# Patient Record
Sex: MALE | Race: ASIAN | NOT HISPANIC OR LATINO
[De-identification: names, ages, dates, MRNs, and addresses within clinical notes are randomized per-mention and may not be internally consistent; named-entity substitution may affect disease eponyms.]

---

## 2019-07-14 ENCOUNTER — FORM ENCOUNTER (OUTPATIENT)
Age: 54
End: 2019-07-14

## 2019-07-15 ENCOUNTER — APPOINTMENT (OUTPATIENT)
Dept: MRI IMAGING | Facility: HOSPITAL | Age: 54
End: 2019-07-15
Payer: COMMERCIAL

## 2019-07-15 ENCOUNTER — APPOINTMENT (OUTPATIENT)
Dept: OTOLARYNGOLOGY | Facility: CLINIC | Age: 54
End: 2019-07-15
Payer: COMMERCIAL

## 2019-07-15 ENCOUNTER — OUTPATIENT (OUTPATIENT)
Dept: OUTPATIENT SERVICES | Facility: HOSPITAL | Age: 54
LOS: 1 days | End: 2019-07-15
Payer: COMMERCIAL

## 2019-07-15 VITALS
DIASTOLIC BLOOD PRESSURE: 79 MMHG | OXYGEN SATURATION: 98 % | WEIGHT: 109 LBS | BODY MASS INDEX: 17.52 KG/M2 | SYSTOLIC BLOOD PRESSURE: 114 MMHG | HEIGHT: 66 IN | HEART RATE: 78 BPM

## 2019-07-15 DIAGNOSIS — Z98.49 CATARACT EXTRACTION STATUS, UNSPECIFIED EYE: Chronic | ICD-10-CM

## 2019-07-15 PROCEDURE — 99203 OFFICE O/P NEW LOW 30 MIN: CPT | Mod: 25

## 2019-07-15 PROCEDURE — A9585: CPT

## 2019-07-15 PROCEDURE — 31231 NASAL ENDOSCOPY DX: CPT

## 2019-07-15 PROCEDURE — 70543 MRI ORBT/FAC/NCK W/O &W/DYE: CPT

## 2019-07-15 PROCEDURE — 70543 MRI ORBT/FAC/NCK W/O &W/DYE: CPT | Mod: 26

## 2019-07-15 NOTE — HISTORY OF PRESENT ILLNESS
[de-identified] : 54M with hx of NP cancer\par completed RT in 1/2019 and chemotherapy in 05/2019\par still with congestion, otalgia, dysphagia and odynophagia \par \par post treatment PET CT still with disease

## 2019-07-15 NOTE — PROCEDURE
[FreeTextEntry1] : nasopharyngoscopy and laryngoscopy  [FreeTextEntry3] : edema in nasopharynx with tumor\par tumor extends from NP to OP\par larynx wnl

## 2019-07-15 NOTE — ASSESSMENT
[FreeTextEntry1] : 54M with hx of NP cancer treated with RT (1/2019) and chemo (05/2019)\par clinical and radiographic signs (PET CT post treatment) of recurrence \par -obtain MRI face/neck with and without contrast asap \par

## 2019-07-15 NOTE — REVIEW OF SYSTEMS
[Nasal Congestion] : nasal congestion [Recurrent Sinus Infections] : recurrent sinus infections [Sinus Pain] : sinus pain [Sense Of Smell Problem] : sense of smell problem [Discolored Nasal Discharge] : discolored nasal discharge [Swelling Neck] : swelling neck

## 2019-07-17 ENCOUNTER — APPOINTMENT (OUTPATIENT)
Dept: OTOLARYNGOLOGY | Facility: CLINIC | Age: 54
End: 2019-07-17
Payer: COMMERCIAL

## 2019-07-17 VITALS
OXYGEN SATURATION: 96 % | SYSTOLIC BLOOD PRESSURE: 138 MMHG | DIASTOLIC BLOOD PRESSURE: 96 MMHG | HEART RATE: 97 BPM | TEMPERATURE: 98.3 F

## 2019-07-17 PROCEDURE — 99213 OFFICE O/P EST LOW 20 MIN: CPT | Mod: 25

## 2019-07-17 PROCEDURE — 31231 NASAL ENDOSCOPY DX: CPT

## 2019-07-17 NOTE — HISTORY OF PRESENT ILLNESS
[de-identified] : 54M w/ PMH of nasopharyngeal cancer, sent for RT (completed 01/2019) and chemo (completed 05/2019) who was referred back after PET scan showed growth/recurrence of tumor. Patient reports headache, bloody nasal mucous, nasal obstruction, left facial swelling, dysphagia of solid food, left ear and nasal pain. He denies antonio epistaxis.  [FreeTextEntry1] : MRI performed 7/15/19- showing tumor extension to entire skull base and enveloping bilateral carotid arteries.

## 2019-07-17 NOTE — PHYSICAL EXAM
[FreeTextEntry1] : cachexic [de-identified] : enlarged submandibular LNs [Nasal Endoscopy Performed] : nasal endoscopy was performed, see procedure section for findings [] : septum deviated to the right [de-identified] : swollen and erythematous nasopharynx B/L [de-identified] : leukoplakia of posterior oropharynx and swelling of soft palate L>R [de-identified] : cavities in multiple teeth [de-identified] : left facial swelling

## 2019-07-17 NOTE — ASSESSMENT
[FreeTextEntry1] : 54M with a PMH of nasopharyngeal CA, s/p unsuccessful RT and chemo, with evidence of tumor expansion on MRI and PET; poor prognosis.\par \par Plan:\par - present at tumor board\par - Rx Bactrim DS for thick nasal secretions\par - f/u with rad/onc for further care

## 2019-07-17 NOTE — REVIEW OF SYSTEMS
[Ear Pain] : ear pain [Nasal Congestion] : nasal congestion [Nose Bleeds] : no nose bleeds [Sinus Pressure] : sinus pressure [Discolored Nasal Discharge] : discolored nasal discharge [As Noted in HPI] : as noted in HPI [Negative] : Allergies [de-identified] : L ear pain [de-identified] : d

## 2019-07-27 ENCOUNTER — OTHER (OUTPATIENT)
Age: 54
End: 2019-07-27

## 2019-07-29 ENCOUNTER — OUTPATIENT (OUTPATIENT)
Dept: OUTPATIENT SERVICES | Facility: HOSPITAL | Age: 54
LOS: 1 days | End: 2019-07-29
Payer: COMMERCIAL

## 2019-07-29 ENCOUNTER — OUTPATIENT (OUTPATIENT)
Dept: OUTPATIENT SERVICES | Facility: HOSPITAL | Age: 54
LOS: 1 days | Discharge: HOME | End: 2019-07-29
Payer: COMMERCIAL

## 2019-07-29 ENCOUNTER — OTHER (OUTPATIENT)
Age: 54
End: 2019-07-29

## 2019-07-29 ENCOUNTER — RESULT REVIEW (OUTPATIENT)
Age: 54
End: 2019-07-29

## 2019-07-29 DIAGNOSIS — Z98.49 CATARACT EXTRACTION STATUS, UNSPECIFIED EYE: Chronic | ICD-10-CM

## 2019-07-29 DIAGNOSIS — C11.9 MALIGNANT NEOPLASM OF NASOPHARYNX, UNSPECIFIED: ICD-10-CM

## 2019-07-29 LAB — SURGICAL PATHOLOGY STUDY: SIGNIFICANT CHANGE UP

## 2019-07-29 PROCEDURE — 88321 CONSLTJ&REPRT SLD PREP ELSWR: CPT

## 2019-07-30 ENCOUNTER — APPOINTMENT (OUTPATIENT)
Dept: HEMATOLOGY ONCOLOGY | Facility: CLINIC | Age: 54
End: 2019-07-30
Payer: COMMERCIAL

## 2019-07-30 ENCOUNTER — RESULT REVIEW (OUTPATIENT)
Age: 54
End: 2019-07-30

## 2019-07-30 ENCOUNTER — LABORATORY RESULT (OUTPATIENT)
Age: 54
End: 2019-07-30

## 2019-07-30 VITALS
OXYGEN SATURATION: 100 % | TEMPERATURE: 97.8 F | SYSTOLIC BLOOD PRESSURE: 103 MMHG | HEIGHT: 66 IN | RESPIRATION RATE: 16 BRPM | WEIGHT: 106.9 LBS | BODY MASS INDEX: 17.18 KG/M2 | HEART RATE: 86 BPM | DIASTOLIC BLOOD PRESSURE: 79 MMHG

## 2019-07-30 DIAGNOSIS — Z56.0 UNEMPLOYMENT, UNSPECIFIED: ICD-10-CM

## 2019-07-30 DIAGNOSIS — Z78.9 OTHER SPECIFIED HEALTH STATUS: ICD-10-CM

## 2019-07-30 DIAGNOSIS — Z82.0 FAMILY HISTORY OF EPILEPSY AND OTHER DISEASES OF THE NERVOUS SYSTEM: ICD-10-CM

## 2019-07-30 DIAGNOSIS — Z87.891 PERSONAL HISTORY OF NICOTINE DEPENDENCE: ICD-10-CM

## 2019-07-30 DIAGNOSIS — Z86.39 PERSONAL HISTORY OF OTHER ENDOCRINE, NUTRITIONAL AND METABOLIC DISEASE: ICD-10-CM

## 2019-07-30 LAB
BASOPHILS # BLD AUTO: 0.1 K/UL — SIGNIFICANT CHANGE UP (ref 0–0.2)
BASOPHILS NFR BLD AUTO: 0.7 % — SIGNIFICANT CHANGE UP (ref 0–2)
EOSINOPHIL # BLD AUTO: 0.8 K/UL — HIGH (ref 0–0.5)
EOSINOPHIL NFR BLD AUTO: 5.4 % — SIGNIFICANT CHANGE UP (ref 0–6)
HCT VFR BLD CALC: 31.2 % — LOW (ref 39–50)
HGB BLD-MCNC: 9.6 G/DL — LOW (ref 13–17)
LYMPHOCYTES # BLD AUTO: 1 K/UL — SIGNIFICANT CHANGE UP (ref 1–3.3)
LYMPHOCYTES # BLD AUTO: 7.2 % — LOW (ref 13–44)
MCHC RBC-ENTMCNC: 21.8 PG — LOW (ref 27–34)
MCHC RBC-ENTMCNC: 30.8 G/DL — LOW (ref 32–36)
MCV RBC AUTO: 70.5 FL — LOW (ref 80–100)
MONOCYTES # BLD AUTO: 1 K/UL — HIGH (ref 0–0.9)
MONOCYTES NFR BLD AUTO: 7.1 % — SIGNIFICANT CHANGE UP (ref 2–14)
NEUTROPHILS # BLD AUTO: 11.2 K/UL — HIGH (ref 1.8–7.4)
NEUTROPHILS NFR BLD AUTO: 79.6 % — HIGH (ref 43–77)
PLATELET # BLD AUTO: 441 K/UL — HIGH (ref 150–400)
RBC # BLD: 4.43 M/UL — SIGNIFICANT CHANGE UP (ref 4.2–5.8)
RBC # FLD: 15.2 % — HIGH (ref 10.3–14.5)
WBC # BLD: 14.1 K/UL — HIGH (ref 3.8–10.5)
WBC # FLD AUTO: 14.1 K/UL — HIGH (ref 3.8–10.5)

## 2019-07-30 PROCEDURE — 99205 OFFICE O/P NEW HI 60 MIN: CPT

## 2019-07-30 RX ORDER — SULFAMETHOXAZOLE AND TRIMETHOPRIM 800; 160 MG/1; MG/1
800-160 TABLET ORAL TWICE DAILY
Qty: 28 | Refills: 0 | Status: DISCONTINUED | COMMUNITY
Start: 2019-07-17 | End: 2019-07-30

## 2019-07-30 SDOH — ECONOMIC STABILITY - INCOME SECURITY: UNEMPLOYMENT, UNSPECIFIED: Z56.0

## 2019-07-30 NOTE — ASSESSMENT
[FreeTextEntry1] : Mr. BIGG GARZA is a pleasant 54 year old man who comes here today to be evaluated for his diagnosis of refractory locally advanced nasopharyngeal cancer. Unfortunately, tumor is refractory/resistant to chemo and radiation. \par \par Pain- started on gabapentin and oxycodone (with Compazine prior). Refer to pain service. Laxatives prescribed. \par \par Oral thrush- fluconazole.\par \par Weight loss- nutritionist.\par \par We discussed with the patient about immunotherapy, explaining the mechanism of actions of immunotherapy agents, potential side effects which include but are not limited to auto-immune processes, such as pneumonitis, skin rashes, colitis, hepatitis, myositis, endocrine effects.\par \par Today I met with our patient regarding treatment with systemic therapy with pembrolizumab.  During the visit, we explained in detail the rationale for both this treatment as well as other standard treatment and palliative options if any available.  We presented and discussed in detail the potential side effects of the medication(s) and procedures. We had given our patient the informed consent form for their review, and all questions about the treatment and potential side effects were discussed and questions answered by me to the patient's apparent satisfaction.\par \par After weighing the risks and benefits, our patient expressed the agreement to receive therapy as indicated above.  The patient signed and dated a copy of the informed consent with a witness as indicated. We gave our patient a copy of the signed informed consent. We placed a copy of the consent form in the [facility] for our record keeping. \par \par Return to clinic on 8/2/19 to evaluate pain control. Pembrolizumab scheduled for 8/2 at 4PM.\par \par \par Marcia Leon MD\par , [facility]\par [facility]\par [address]\[place], [place], [address]\par Tel: [phone]\par Fax: [phone]\par \par

## 2019-07-30 NOTE — PHYSICAL EXAM
[Ambulatory and capable of all self care but unable to carry out any work activities] : Status 2- Ambulatory and capable of all self care but unable to carry out any work activities. Up and about more than 50% of waking hours [Cachectic] : cachectic [Normal] : grossly intact [de-identified] : oral thrush, facial edema on left side face

## 2019-07-30 NOTE — REASON FOR VISIT
[Initial Consultation] : an initial consultation [Spouse] : spouse [FreeTextEntry2] : nasopharyngeal cancer

## 2019-07-30 NOTE — HISTORY OF PRESENT ILLNESS
[Disease: _____________________] : Disease: [unfilled] [de-identified] : non-keratinizing nasopharyngeal carcinoma [de-identified] : faint lung infiltrates\par refractory to chemo/radiation [de-identified] : Mr. BIGG GARZA is a pleasant 54 year old man who comes here today to be evaluated for his diagnosis of nasopharyngeal cancer.\par \par Oncological history\par Mr. Garza noted around March 2018 along with left sided hearing loss, bilateral nasal congestion and headaches. As symptoms did not improved, he sought medical care. Eventually he underwent a biopsy in October 2018 as below. \par \par Pathology- nasopharyngeal biopsy ([place]) 10/19/18- non-keratinizing, differentiated nasopharyngeal carcinoma\par \par CT chest 11/13/18- small calcified granuloma right apex. Subcentimeter right hepatic cyst 4mm. \par \par MRI neck 11/13/2018- 2.6x4.2x5.4cm left nasopharyngeal carcinoma extending into the skull base. Borderline right level 1 and 2 nodes. \par \par Treated with radiation (70Gy) with cisplatin followed by adjuvant cisplatin 70mg/m2 and 5FU from November 27, 2018 to May 8, 2019 at [facility]. \par \par CT neck 4/16/19- decrease in size of nasopharyngeal mass. \par \par Around March 2019 he continued to have headaches and as they became worse, further imaging was done. \par \par PET 7/9/19- Large lobulated active nasopharyngeal mass with SUV of 21.9, extending from the upper oropharynx into the skull base. Comparison to CT form April shows tumor grow. Multiple faint infiltrates in both lungs, likely inflammatory. There is no evidence of metastatic disease- official report not available. \par \par MRI head 7/15/19\par Findings are compatible with extensive recurrence involving the central skull base, nasopharynx, oropharynx, and parapharyngeal soft tissues, as above. Polypoid intraluminal mass filling the nasopharynx and oropharynx demonstrates unusual signal characteristics along its inferior margin with intrinsic high T1 signal and internal signal voids at are possibly vascular. \par \par \par \par  [de-identified] : EBV and p16 positive [de-identified] : Patient is complaining of "tearing apart" headaches on top of head, radiating to ear and jaw and all over his left side, 10/10. Taking Aleve for pain, but not having any improvement. Percocet makes him nauseated. Nasal discharge bilaterally constant, bloody. Lost weight. Daily cough. Has feeling that there is something stuck in throat. Grade 1 neuropathy hands and feet. Constipation. Limited activity due to headache. Not sleeping due to pain. Anorexia. Edema on left side of face and chin. All other ROS negative.

## 2019-07-31 ENCOUNTER — OTHER (OUTPATIENT)
Age: 54
End: 2019-07-31

## 2019-07-31 LAB
ALBUMIN SERPL ELPH-MCNC: 3.8 G/DL
ALP BLD-CCNC: 68 U/L
ALT SERPL-CCNC: 9 U/L
ANION GAP SERPL CALC-SCNC: 10 MMOL/L
AST SERPL-CCNC: 13 U/L
BILIRUB SERPL-MCNC: 0.2 MG/DL
BUN SERPL-MCNC: 21 MG/DL
CALCIUM SERPL-MCNC: 9.8 MG/DL
CHLORIDE SERPL-SCNC: 96 MMOL/L
CO2 SERPL-SCNC: 30 MMOL/L
CREAT SERPL-MCNC: 1.38 MG/DL
GLUCOSE SERPL-MCNC: 136 MG/DL
HBV CORE IGG+IGM SER QL: NONREACTIVE
HBV CORE IGM SER QL: NONREACTIVE
HBV SURFACE AB SER QL: NONREACTIVE
HBV SURFACE AG SER QL: NONREACTIVE
HCV AB SER QL: NONREACTIVE
HCV S/CO RATIO: 0.15 S/CO
MAGNESIUM SERPL-MCNC: 1.5 MG/DL
PHOSPHATE SERPL-MCNC: 3.7 MG/DL
POTASSIUM SERPL-SCNC: 4.7 MMOL/L
PROT SERPL-MCNC: 6.5 G/DL
SODIUM SERPL-SCNC: 136 MMOL/L
TSH SERPL-ACNC: 8.44 UIU/ML

## 2019-07-31 RX ORDER — LEVOTHYROXINE SODIUM 0.03 MG/1
25 TABLET ORAL
Qty: 30 | Refills: 6 | Status: ACTIVE | COMMUNITY
Start: 2019-07-31 | End: 1900-01-01

## 2019-08-02 ENCOUNTER — APPOINTMENT (OUTPATIENT)
Dept: HEMATOLOGY ONCOLOGY | Facility: CLINIC | Age: 54
End: 2019-08-02
Payer: COMMERCIAL

## 2019-08-02 ENCOUNTER — APPOINTMENT (OUTPATIENT)
Dept: INFUSION THERAPY | Facility: HOSPITAL | Age: 54
End: 2019-08-02

## 2019-08-02 VITALS
HEART RATE: 107 BPM | SYSTOLIC BLOOD PRESSURE: 95 MMHG | RESPIRATION RATE: 17 BRPM | DIASTOLIC BLOOD PRESSURE: 67 MMHG | BODY MASS INDEX: 17.12 KG/M2 | OXYGEN SATURATION: 95 % | WEIGHT: 106.04 LBS | TEMPERATURE: 98.8 F

## 2019-08-02 PROCEDURE — 93010 ELECTROCARDIOGRAM REPORT: CPT

## 2019-08-02 PROCEDURE — 99213 OFFICE O/P EST LOW 20 MIN: CPT

## 2019-08-05 ENCOUNTER — CLINICAL ADVICE (OUTPATIENT)
Age: 54
End: 2019-08-05

## 2019-08-05 DIAGNOSIS — Z51.11 ENCOUNTER FOR ANTINEOPLASTIC CHEMOTHERAPY: ICD-10-CM

## 2019-08-05 DIAGNOSIS — E86.0 DEHYDRATION: ICD-10-CM

## 2019-08-05 DIAGNOSIS — R11.2 NAUSEA WITH VOMITING, UNSPECIFIED: ICD-10-CM

## 2019-08-05 NOTE — PHYSICAL EXAM
[Ambulatory and capable of all self care but unable to carry out any work activities] : Status 2- Ambulatory and capable of all self care but unable to carry out any work activities. Up and about more than 50% of waking hours [Cachectic] : cachectic [Normal] : affect appropriate [de-identified] : oral thrush, facial edema on left side face

## 2019-08-05 NOTE — HISTORY OF PRESENT ILLNESS
[Disease: _____________________] : Disease: [unfilled] [de-identified] : Mr. BIGG GARZA is a pleasant 54 year old man who comes here today to be evaluated for his diagnosis of nasopharyngeal cancer. He is here prior to cycle #1 of pembrolizumab and for follow up re pain control. \par \par Oncological history\par Mr. Garza noted around March 2018 along with left sided hearing loss, bilateral nasal congestion and headaches. As symptoms did not improved, he sought medical care. Eventually he underwent a biopsy in October 2018 as below. \par \par Pathology- nasopharyngeal biopsy ([place]) 10/19/18- non-keratinizing, differentiated nasopharyngeal carcinoma\par \par CT chest 11/13/18- small calcified granuloma right apex. Subcentimeter right hepatic cyst 4mm. \par \par MRI neck 11/13/2018- 2.6x4.2x5.4cm left nasopharyngeal carcinoma extending into the skull base. Borderline right level 1 and 2 nodes. \par \par Treated with radiation (70Gy) with cisplatin followed by adjuvant cisplatin 70mg/m2 and 5FU from November 27, 2018 to May 8, 2019 at [facility]. \par \par CT neck 4/16/19- decrease in size of nasopharyngeal mass. \par \par Around March 2019 he continued to have headaches and as they became worse, further imaging was done. \par \par PET 7/9/19- Large lobulated active nasopharyngeal mass with SUV of 21.9, extending from the upper oropharynx into the skull base. Comparison to CT form April shows tumor grow. Multiple faint infiltrates in both lungs, likely inflammatory. There is no evidence of metastatic disease- official report not available. \par \par MRI head 7/15/19\par Findings are compatible with extensive recurrence involving the central skull base, nasopharynx, oropharynx, and parapharyngeal soft tissues, as above. Polypoid intraluminal mass filling the nasopharynx and oropharynx demonstrates unusual signal characteristics along its inferior margin with intrinsic high T1 signal and internal signal voids at are possibly vascular. \par \par \par \par  [de-identified] : non-keratinizing nasopharyngeal carcinoma [de-identified] : EBV and p16 positive [de-identified] : faint lung infiltrates\par refractory to chemo/radiation [de-identified] : Patient is complaining of "tearing apart" headaches on top of head, radiating to ear and jaw and all over his left side, 10/10. Taking Aleve for pain, but not having any improvement. Percocet makes him nauseated. Nasal discharge bilaterally constant, bloody. Lost weight. Daily cough. Has feeling that there is something stuck in throat. Grade 1 neuropathy hands and feet. Constipation. Limited activity due to headache. Not sleeping due to pain. Anorexia. Edema on left side of face and chin. All other ROS negative.

## 2019-08-05 NOTE — REASON FOR VISIT
[Spouse] : spouse [Initial Consultation] : an initial consultation [FreeTextEntry2] : nasopharyngeal cancer

## 2019-08-05 NOTE — HISTORY OF PRESENT ILLNESS
[Disease: _____________________] : Disease: [unfilled] [de-identified] : Mr. BIGG GARZA is a pleasant 54 year old man who comes here today to be evaluated for his diagnosis of nasopharyngeal cancer. He is here prior to cycle #1 of pembrolizumab and for follow up re pain control. \par \par Oncological history\par Mr. Garza noted around March 2018 along with left sided hearing loss, bilateral nasal congestion and headaches. As symptoms did not improved, he sought medical care. Eventually he underwent a biopsy in October 2018 as below. \par \par Pathology- nasopharyngeal biopsy ([place]) 10/19/18- non-keratinizing, differentiated nasopharyngeal carcinoma\par \par CT chest 11/13/18- small calcified granuloma right apex. Subcentimeter right hepatic cyst 4mm. \par \par MRI neck 11/13/2018- 2.6x4.2x5.4cm left nasopharyngeal carcinoma extending into the skull base. Borderline right level 1 and 2 nodes. \par \par Treated with radiation (70Gy) with cisplatin followed by adjuvant cisplatin 70mg/m2 and 5FU from November 27, 2018 to May 8, 2019 at [facility]. \par \par CT neck 4/16/19- decrease in size of nasopharyngeal mass. \par \par Around March 2019 he continued to have headaches and as they became worse, further imaging was done. \par \par PET 7/9/19- Large lobulated active nasopharyngeal mass with SUV of 21.9, extending from the upper oropharynx into the skull base. Comparison to CT form April shows tumor grow. Multiple faint infiltrates in both lungs, likely inflammatory. There is no evidence of metastatic disease- official report not available. \par \par MRI head 7/15/19\par Findings are compatible with extensive recurrence involving the central skull base, nasopharynx, oropharynx, and parapharyngeal soft tissues, as above. Polypoid intraluminal mass filling the nasopharynx and oropharynx demonstrates unusual signal characteristics along its inferior margin with intrinsic high T1 signal and internal signal voids at are possibly vascular. \par \par \par \par  [de-identified] : non-keratinizing nasopharyngeal carcinoma [de-identified] : EBV and p16 positive [de-identified] : faint lung infiltrates\par refractory to chemo/radiation [de-identified] : Patient is complaining of "tearing apart" headaches on top of head, radiating to ear and jaw and all over his left side, 10/10. Taking Aleve for pain, but not having any improvement. Percocet makes him nauseated. Nasal discharge bilaterally constant, bloody. Lost weight. Daily cough. Has feeling that there is something stuck in throat. Grade 1 neuropathy hands and feet. Constipation. Limited activity due to headache. Not sleeping due to pain. Anorexia. Edema on left side of face and chin. All other ROS negative.

## 2019-08-05 NOTE — ASSESSMENT
[FreeTextEntry1] : Mr. BIGG GARZA is a pleasant 54 year old man who comes here today to be evaluated for his diagnosis of refractory locally advanced nasopharyngeal cancer. Unfortunately, tumor is refractory/resistant to chemo and radiation. \par \par Pain- started on gabapentin and oxycodone (with Compazine prior). Refer to pain service. Laxatives prescribed. \par \par Oral thrush- fluconazole.\par \par Weight loss- nutritionist.\par \par We discussed with the patient about immunotherapy, explaining the mechanism of actions of immunotherapy agents, potential side effects which include but are not limited to auto-immune processes, such as pneumonitis, skin rashes, colitis, hepatitis, myositis, endocrine effects.\par \par Today I met with our patient regarding treatment with systemic therapy with pembrolizumab.  During the visit, we explained in detail the rationale for both this treatment as well as other standard treatment and palliative options if any available.  We presented and discussed in detail the potential side effects of the medication(s) and procedures. We had given our patient the informed consent form for their review, and all questions about the treatment and potential side effects were discussed and questions answered by me to the patient's apparent satisfaction.\par \par After weighing the risks and benefits, our patient expressed the agreement to receive therapy as indicated above.  The patient signed and dated a copy of the informed consent with a witness as indicated. We gave our patient a copy of the signed informed consent. We placed a copy of the consent form in the [facility] for our record keeping. \par \par Return to clinic on 8/2/19 to evaluate pain control. Pembrolizumab scheduled for 8/2 at 4PM.\par \par \par Marcia Leon MD\par , [facility]\par [facility]\par [address]\[place], [place], [address]\par Tel: [phone]\par Fax: [phone]\par \par

## 2019-08-05 NOTE — PHYSICAL EXAM
[Ambulatory and capable of all self care but unable to carry out any work activities] : Status 2- Ambulatory and capable of all self care but unable to carry out any work activities. Up and about more than 50% of waking hours [Cachectic] : cachectic [Normal] : affect appropriate [de-identified] : oral thrush, facial edema on left side face

## 2019-08-05 NOTE — ASSESSMENT
[FreeTextEntry1] : Mr. BIGG GARZA is a pleasant 54 year old man who comes here today to be evaluated for his diagnosis of refractory locally advanced nasopharyngeal cancer. Unfortunately, tumor is refractory/resistant to chemo and radiation. \par \par Pain- started on gabapentin and oxycodone (with Compazine prior). Refer to pain service. Laxatives prescribed. \par \par Oral thrush- fluconazole.\par \par Weight loss- nutritionist.\par \par We discussed with the patient about immunotherapy, explaining the mechanism of actions of immunotherapy agents, potential side effects which include but are not limited to auto-immune processes, such as pneumonitis, skin rashes, colitis, hepatitis, myositis, endocrine effects.\par \par Today I met with our patient regarding treatment with systemic therapy with pembrolizumab.  During the visit, we explained in detail the rationale for both this treatment as well as other standard treatment and palliative options if any available.  We presented and discussed in detail the potential side effects of the medication(s) and procedures. We had given our patient the informed consent form for their review, and all questions about the treatment and potential side effects were discussed and questions answered by me to the patient's apparent satisfaction.\par \par After weighing the risks and benefits, our patient expressed the agreement to receive therapy as indicated above.  The patient signed and dated a copy of the informed consent with a witness as indicated. We gave our patient a copy of the signed informed consent. We placed a copy of the consent form in the [facility] for our record keeping. \par \par Return to clinic on 8/2/19 to evaluate pain control. Pembrolizumab scheduled for 8/2 at 4PM.\par \par \par Marcia Leon MD\par , [facility]\par [facility]\par [address]\[place], [place], [address]\par Tel: [phone]\par Fax: [phone]\par \par

## 2019-08-07 ENCOUNTER — OTHER (OUTPATIENT)
Age: 54
End: 2019-08-07

## 2019-08-13 ENCOUNTER — CLINICAL ADVICE (OUTPATIENT)
Age: 54
End: 2019-08-13

## 2019-08-15 ENCOUNTER — CLINICAL ADVICE (OUTPATIENT)
Age: 54
End: 2019-08-15

## 2019-08-16 ENCOUNTER — OTHER (OUTPATIENT)
Age: 54
End: 2019-08-16

## 2019-08-19 ENCOUNTER — OTHER (OUTPATIENT)
Age: 54
End: 2019-08-19

## 2019-08-19 ENCOUNTER — APPOINTMENT (OUTPATIENT)
Dept: OTOLARYNGOLOGY | Facility: CLINIC | Age: 54
End: 2019-08-19
Payer: COMMERCIAL

## 2019-08-19 VITALS
HEIGHT: 66 IN | HEART RATE: 98 BPM | SYSTOLIC BLOOD PRESSURE: 124 MMHG | WEIGHT: 106 LBS | BODY MASS INDEX: 17.04 KG/M2 | DIASTOLIC BLOOD PRESSURE: 92 MMHG

## 2019-08-19 DIAGNOSIS — J32.9 CHRONIC SINUSITIS, UNSPECIFIED: ICD-10-CM

## 2019-08-19 DIAGNOSIS — H91.93 UNSPECIFIED HEARING LOSS, BILATERAL: ICD-10-CM

## 2019-08-19 DIAGNOSIS — H65.93 UNSPECIFIED NONSUPPURATIVE OTITIS MEDIA, BILATERAL: ICD-10-CM

## 2019-08-19 PROCEDURE — 99214 OFFICE O/P EST MOD 30 MIN: CPT | Mod: 25

## 2019-08-19 PROCEDURE — 31231 NASAL ENDOSCOPY DX: CPT

## 2019-08-19 NOTE — HISTORY OF PRESENT ILLNESS
[de-identified] : Pt referred by Dr. Leon.  She has progressive NPC s/p CXRT in [place].  Based on his imaging this is not resectable disease and the patient was evaluated by Dr. Alvarado last month.  He is being treated by Dr. Leon with Keytruda.  Pt is getting second cycle on Friday.  Pt is here for congestion, ear fullness and dysphagia.  Pt has an appointment with Ellen Alonzo, SLP on Thursday for swallow evaluation.

## 2019-08-19 NOTE — PHYSICAL EXAM
[de-identified] : Posttreatment changes. [de-identified] : BRIAN RUSSELL. [FreeTextEntry1] : Clear nasal secretions dripping through both nostrils.   [Normal] : orientation to person, place, and time: normal

## 2019-08-19 NOTE — DATA REVIEWED
[de-identified] : MRI with extensive disease in the NPx and OPx with involvement of the skull base around both internal carotid arteries.

## 2019-08-19 NOTE — PROCEDURE
[None] : none [Flexible Endoscope] : examined with the flexible endoscope [Serial Number: ___] : Serial Number: [unfilled] [FreeTextEntry6] : Very congested nasal cavity with clear thick secretions in both NC from [facility].  The posterior NC appears obstructed from the NPC and I can't really evaluate into the NPx or around the ETO bilaterally.  On retroflexion from the OC, the entire NPx is obstructed. [de-identified] : NPC

## 2019-08-22 ENCOUNTER — APPOINTMENT (OUTPATIENT)
Dept: SPEECH THERAPY | Facility: CLINIC | Age: 54
End: 2019-08-22

## 2019-08-22 ENCOUNTER — APPOINTMENT (OUTPATIENT)
Dept: HEMATOLOGY ONCOLOGY | Facility: CLINIC | Age: 54
End: 2019-08-22
Payer: COMMERCIAL

## 2019-08-22 ENCOUNTER — RESULT REVIEW (OUTPATIENT)
Age: 54
End: 2019-08-22

## 2019-08-22 VITALS
SYSTOLIC BLOOD PRESSURE: 112 MMHG | BODY MASS INDEX: 5.44 KG/M2 | RESPIRATION RATE: 16 BRPM | WEIGHT: 99.21 LBS | DIASTOLIC BLOOD PRESSURE: 82 MMHG | OXYGEN SATURATION: 100 % | HEART RATE: 109 BPM

## 2019-08-22 DIAGNOSIS — R63.4 ABNORMAL WEIGHT LOSS: ICD-10-CM

## 2019-08-22 DIAGNOSIS — R09.81 NASAL CONGESTION: ICD-10-CM

## 2019-08-22 LAB
ALBUMIN SERPL ELPH-MCNC: 3.5 G/DL
ALP BLD-CCNC: 62 U/L
ALT SERPL-CCNC: 18 U/L
ANION GAP SERPL CALC-SCNC: 15 MMOL/L
AST SERPL-CCNC: 24 U/L
BASOPHILS # BLD AUTO: 0.1 K/UL — SIGNIFICANT CHANGE UP (ref 0–0.2)
BASOPHILS NFR BLD AUTO: 0.3 % — SIGNIFICANT CHANGE UP (ref 0–2)
BILIRUB SERPL-MCNC: 0.2 MG/DL
BUN SERPL-MCNC: 15 MG/DL
CALCIUM SERPL-MCNC: 10.3 MG/DL
CHLORIDE SERPL-SCNC: 86 MMOL/L
CO2 SERPL-SCNC: 31 MMOL/L
CREAT SERPL-MCNC: 1.19 MG/DL
EOSINOPHIL # BLD AUTO: 0.1 K/UL — SIGNIFICANT CHANGE UP (ref 0–0.5)
EOSINOPHIL NFR BLD AUTO: 0.7 % — SIGNIFICANT CHANGE UP (ref 0–6)
GLUCOSE SERPL-MCNC: 140 MG/DL
HCT VFR BLD CALC: 28.5 % — LOW (ref 39–50)
HGB BLD-MCNC: 9 G/DL — LOW (ref 13–17)
LYMPHOCYTES # BLD AUTO: 0.8 K/UL — LOW (ref 1–3.3)
LYMPHOCYTES # BLD AUTO: 4.6 % — LOW (ref 13–44)
MAGNESIUM SERPL-MCNC: 1.7 MG/DL
MCHC RBC-ENTMCNC: 21.7 PG — LOW (ref 27–34)
MCHC RBC-ENTMCNC: 31.8 G/DL — LOW (ref 32–36)
MCV RBC AUTO: 68.2 FL — LOW (ref 80–100)
MONOCYTES # BLD AUTO: 1.1 K/UL — HIGH (ref 0–0.9)
MONOCYTES NFR BLD AUTO: 6.7 % — SIGNIFICANT CHANGE UP (ref 2–14)
NEUTROPHILS # BLD AUTO: 14.8 K/UL — HIGH (ref 1.8–7.4)
NEUTROPHILS NFR BLD AUTO: 87.7 % — HIGH (ref 43–77)
PLATELET # BLD AUTO: 538 K/UL — HIGH (ref 150–400)
POTASSIUM SERPL-SCNC: 4.8 MMOL/L
PROT SERPL-MCNC: 6.6 G/DL
RBC # BLD: 4.17 M/UL — LOW (ref 4.2–5.8)
RBC # FLD: 14.9 % — HIGH (ref 10.3–14.5)
SODIUM SERPL-SCNC: 132 MMOL/L
TSH SERPL-ACNC: 3.47 UIU/ML
WBC # BLD: 16.8 K/UL — HIGH (ref 3.8–10.5)
WBC # FLD AUTO: 16.8 K/UL — HIGH (ref 3.8–10.5)

## 2019-08-22 PROCEDURE — 99214 OFFICE O/P EST MOD 30 MIN: CPT

## 2019-08-22 RX ORDER — GABAPENTIN 250 MG/5ML
300 SOLUTION ORAL
Qty: 5 | Refills: 3 | Status: ACTIVE | COMMUNITY
Start: 2019-07-30 | End: 1900-01-01

## 2019-08-22 NOTE — ASSESSMENT
[FreeTextEntry1] : Mr. BIGG GARZA is a pleasant 54 year old man who comes here today to be evaluated for his diagnosis of refractory locally advanced nasopharyngeal cancer. Unfortunately, tumor is refractory/resistant to chemo and radiation. \par \par Proceed with cycle #2 of pembrolizumab tomorrow.\par \par Pain- increased fentanyl patch to 25mcg/day- prescribed. Instructed to take oxycodone 20mg every 4h and continue on f gabapentin and oxycodone (with Compazine prior). \par \par Weight loss- seen by nutritionist today.\par \par Nasal congestion - due to tumor- instructed to sleep on recliner. \par \par Return to clinic in 3 weeks. There is no change in past medical history, social history or family history since our last visit. Cancer diagnosis does not affect other existing medical issues at the time.\par \par \par \par Marcia Leon MD\par , [facility]\par [facility]\par [address]\[place], [place], [address]\par Tel: [phone]\par Fax: [phone]\par \par

## 2019-08-22 NOTE — HISTORY OF PRESENT ILLNESS
[Disease: _____________________] : Disease: [unfilled] [de-identified] : non-keratinizing nasopharyngeal carcinoma [de-identified] : Mr. BIGG GARZA is a pleasant 54 year old man who comes here today prior to cycle #2 of pembrolizumab and to to be evaluated for his diagnosis of nasopharyngeal cancer.\par \par Oncological history\par Mr. Garza noted around March 2018 along with left sided hearing loss, bilateral nasal congestion and headaches. As symptoms did not improved, he sought medical care. Eventually he underwent a biopsy in October 2018 as below. \par \par Pathology- nasopharyngeal biopsy ([place]) 10/19/18- non-keratinizing, differentiated nasopharyngeal carcinoma\par \par CT chest 11/13/18- small calcified granuloma right apex. Subcentimeter right hepatic cyst 4mm. \par \par MRI neck 11/13/2018- 2.6x4.2x5.4cm left nasopharyngeal carcinoma extending into the skull base. Borderline right level 1 and 2 nodes. \par \par Treated with radiation (70Gy) with cisplatin followed by adjuvant cisplatin 70mg/m2 and 5FU from November 27, 2018 to May 8, 2019 at [facility]. \par \par CT neck 4/16/19- decrease in size of nasopharyngeal mass. \par \par Around March 2019 he continued to have headaches and as they became worse, further imaging was done. \par \par PET 7/9/19- Large lobulated active nasopharyngeal mass with SUV of 21.9, extending from the upper oropharynx into the skull base. Comparison to CT form April shows tumor grow. Multiple faint infiltrates in both lungs, likely inflammatory. There is no evidence of metastatic disease- official report not available. \par \par MRI head 7/15/19\par Findings are compatible with extensive recurrence involving the central skull base, nasopharynx, oropharynx, and parapharyngeal soft tissues, as above. Polypoid intraluminal mass filling the nasopharynx and oropharynx demonstrates unusual signal characteristics along its inferior margin with intrinsic high T1 signal and internal signal voids at are possibly vascular. \par \par \par \par  [de-identified] : EBV and p16 positive [de-identified] : faint lung infiltrates\par refractory to chemo/radiation [de-identified] : Patient states that pain is still present - mostly on his head. Pain is still intense at times, does not radiate. Not taking oxycodone dosage prescribed. No nausea or vomiting. Nasal discharge bilaterally constant, bloody. Lost weight. Very tired as per wife.  Grade 1 neuropathy hands and feet. All other ROS negative.

## 2019-08-22 NOTE — PHYSICAL EXAM
[Ambulatory and capable of all self care but unable to carry out any work activities] : Status 2- Ambulatory and capable of all self care but unable to carry out any work activities. Up and about more than 50% of waking hours [Cachectic] : cachectic [Normal] : affect appropriate [de-identified] :  facial edema on left side

## 2019-08-23 ENCOUNTER — APPOINTMENT (OUTPATIENT)
Dept: INFUSION THERAPY | Facility: HOSPITAL | Age: 54
End: 2019-08-23

## 2019-08-26 ENCOUNTER — INPATIENT (INPATIENT)
Facility: HOSPITAL | Age: 54
LOS: 11 days | Discharge: HOME | End: 2019-09-07
Attending: STUDENT IN AN ORGANIZED HEALTH CARE EDUCATION/TRAINING PROGRAM | Admitting: STUDENT IN AN ORGANIZED HEALTH CARE EDUCATION/TRAINING PROGRAM
Payer: COMMERCIAL

## 2019-08-26 ENCOUNTER — APPOINTMENT (OUTPATIENT)
Dept: HEMATOLOGY ONCOLOGY | Facility: CLINIC | Age: 54
End: 2019-08-26

## 2019-08-26 VITALS
SYSTOLIC BLOOD PRESSURE: 147 MMHG | RESPIRATION RATE: 18 BRPM | DIASTOLIC BLOOD PRESSURE: 93 MMHG | OXYGEN SATURATION: 96 % | HEART RATE: 95 BPM

## 2019-08-26 DIAGNOSIS — Z98.49 CATARACT EXTRACTION STATUS, UNSPECIFIED EYE: Chronic | ICD-10-CM

## 2019-08-26 NOTE — ED ADULT TRIAGE NOTE - CHIEF COMPLAINT QUOTE
Pt comes in from home for reported FTT and reported AMS yesterday per wife. Pt appeared to be hallucinating then, today a/o x4. Pt denies CP SOB, is a CA pt at [facility] and brought in for evaluation Pt comes in from home for reported FTT and reported AMS yesterday per wife. Pt appeared to be hallucinating then, today a/o x4. Pt denies CP SOB, is a CA pt at [facility] and brought in for evaluation. Pt noted with blood tinged drainage from nose, but wife reports that is normal.

## 2019-08-27 DIAGNOSIS — Z29.9 ENCOUNTER FOR PROPHYLACTIC MEASURES, UNSPECIFIED: ICD-10-CM

## 2019-08-27 DIAGNOSIS — R62.7 ADULT FAILURE TO THRIVE: ICD-10-CM

## 2019-08-27 DIAGNOSIS — E03.9 HYPOTHYROIDISM, UNSPECIFIED: ICD-10-CM

## 2019-08-27 DIAGNOSIS — C11.9 MALIGNANT NEOPLASM OF NASOPHARYNX, UNSPECIFIED: ICD-10-CM

## 2019-08-27 LAB
ALBUMIN SERPL ELPH-MCNC: 3.4 G/DL — SIGNIFICANT CHANGE UP (ref 3.3–5)
ALP SERPL-CCNC: 59 U/L — SIGNIFICANT CHANGE UP (ref 40–120)
ALT FLD-CCNC: 16 U/L — SIGNIFICANT CHANGE UP (ref 4–41)
ANION GAP SERPL CALC-SCNC: 12 MMO/L — SIGNIFICANT CHANGE UP (ref 7–14)
ANISOCYTOSIS BLD QL: SLIGHT — SIGNIFICANT CHANGE UP
APPEARANCE UR: SIGNIFICANT CHANGE UP
APTT BLD: 32 SEC — SIGNIFICANT CHANGE UP (ref 27.5–36.3)
AST SERPL-CCNC: 20 U/L — SIGNIFICANT CHANGE UP (ref 4–40)
BACTERIA # UR AUTO: NEGATIVE — SIGNIFICANT CHANGE UP
BASE EXCESS BLDV CALC-SCNC: 12.6 MMOL/L — SIGNIFICANT CHANGE UP
BASOPHILS # BLD AUTO: 0.09 K/UL — SIGNIFICANT CHANGE UP (ref 0–0.2)
BASOPHILS NFR BLD AUTO: 0.6 % — SIGNIFICANT CHANGE UP (ref 0–2)
BASOPHILS NFR SPEC: 1.7 % — SIGNIFICANT CHANGE UP (ref 0–2)
BILIRUB SERPL-MCNC: 0.3 MG/DL — SIGNIFICANT CHANGE UP (ref 0.2–1.2)
BILIRUB UR-MCNC: NEGATIVE — SIGNIFICANT CHANGE UP
BLASTS # FLD: 0 % — SIGNIFICANT CHANGE UP (ref 0–0)
BLOOD UR QL VISUAL: NEGATIVE — SIGNIFICANT CHANGE UP
BUN SERPL-MCNC: 28 MG/DL — HIGH (ref 7–23)
CALCIUM SERPL-MCNC: 10.5 MG/DL — SIGNIFICANT CHANGE UP (ref 8.4–10.5)
CHLORIDE SERPL-SCNC: 92 MMOL/L — LOW (ref 98–107)
CO2 SERPL-SCNC: 35 MMOL/L — HIGH (ref 22–31)
COLOR SPEC: SIGNIFICANT CHANGE UP
CREAT SERPL-MCNC: 1.26 MG/DL — SIGNIFICANT CHANGE UP (ref 0.5–1.3)
EOSINOPHIL # BLD AUTO: 0.05 K/UL — SIGNIFICANT CHANGE UP (ref 0–0.5)
EOSINOPHIL NFR BLD AUTO: 0.3 % — SIGNIFICANT CHANGE UP (ref 0–6)
EOSINOPHIL NFR FLD: 0 % — SIGNIFICANT CHANGE UP (ref 0–6)
GAS PNL BLDV: 137 MMOL/L — SIGNIFICANT CHANGE UP (ref 136–146)
GLUCOSE BLDV-MCNC: 110 MG/DL — HIGH (ref 70–99)
GLUCOSE SERPL-MCNC: 118 MG/DL — HIGH (ref 70–99)
GLUCOSE UR-MCNC: NEGATIVE — SIGNIFICANT CHANGE UP
HCO3 BLDV-SCNC: 35 MMOL/L — HIGH (ref 20–27)
HCT VFR BLD CALC: 28.8 % — LOW (ref 39–50)
HCT VFR BLDV CALC: 25.4 % — LOW (ref 39–51)
HGB BLD-MCNC: 8.4 G/DL — LOW (ref 13–17)
HGB BLDV-MCNC: 8.2 G/DL — LOW (ref 13–17)
HYALINE CASTS # UR AUTO: NEGATIVE — SIGNIFICANT CHANGE UP
HYPOCHROMIA BLD QL: SIGNIFICANT CHANGE UP
IMM GRANULOCYTES NFR BLD AUTO: 0.6 % — SIGNIFICANT CHANGE UP (ref 0–1.5)
INR BLD: 1.18 — HIGH (ref 0.88–1.17)
KETONES UR-MCNC: SIGNIFICANT CHANGE UP
LEUKOCYTE ESTERASE UR-ACNC: NEGATIVE — SIGNIFICANT CHANGE UP
LYMPHOCYTES # BLD AUTO: 0.71 K/UL — LOW (ref 1–3.3)
LYMPHOCYTES # BLD AUTO: 4.4 % — LOW (ref 13–44)
LYMPHOCYTES NFR SPEC AUTO: 2.6 % — LOW (ref 13–44)
MCHC RBC-ENTMCNC: 19.6 PG — LOW (ref 27–34)
MCHC RBC-ENTMCNC: 29.2 % — LOW (ref 32–36)
MCV RBC AUTO: 67.3 FL — LOW (ref 80–100)
METAMYELOCYTES # FLD: 0 % — SIGNIFICANT CHANGE UP (ref 0–1)
MICROCYTES BLD QL: SIGNIFICANT CHANGE UP
MONOCYTES # BLD AUTO: 0.9 K/UL — SIGNIFICANT CHANGE UP (ref 0–0.9)
MONOCYTES NFR BLD AUTO: 5.5 % — SIGNIFICANT CHANGE UP (ref 2–14)
MONOCYTES NFR BLD: 1.8 % — LOW (ref 2–9)
MYELOCYTES NFR BLD: 0 % — SIGNIFICANT CHANGE UP (ref 0–0)
NEUTROPHIL AB SER-ACNC: 93.9 % — HIGH (ref 43–77)
NEUTROPHILS # BLD AUTO: 14.41 K/UL — HIGH (ref 1.8–7.4)
NEUTROPHILS NFR BLD AUTO: 88.6 % — HIGH (ref 43–77)
NEUTS BAND # BLD: 0 % — SIGNIFICANT CHANGE UP (ref 0–6)
NITRITE UR-MCNC: NEGATIVE — SIGNIFICANT CHANGE UP
NRBC # FLD: 0 K/UL — SIGNIFICANT CHANGE UP (ref 0–0)
OTHER - HEMATOLOGY %: 0 — SIGNIFICANT CHANGE UP
PCO2 BLDV: 64 MMHG — HIGH (ref 41–51)
PH BLDV: 7.39 PH — SIGNIFICANT CHANGE UP (ref 7.32–7.43)
PH UR: 7.5 — SIGNIFICANT CHANGE UP (ref 5–8)
PLATELET # BLD AUTO: 482 K/UL — HIGH (ref 150–400)
PLATELET COUNT - ESTIMATE: NORMAL — SIGNIFICANT CHANGE UP
PMV BLD: 9.6 FL — SIGNIFICANT CHANGE UP (ref 7–13)
PO2 BLDV: 17 MMHG — LOW (ref 35–40)
POIKILOCYTOSIS BLD QL AUTO: SLIGHT — SIGNIFICANT CHANGE UP
POLYCHROMASIA BLD QL SMEAR: SLIGHT — SIGNIFICANT CHANGE UP
POTASSIUM BLDV-SCNC: 4.1 MMOL/L — SIGNIFICANT CHANGE UP (ref 3.4–4.5)
POTASSIUM SERPL-MCNC: 4.3 MMOL/L — SIGNIFICANT CHANGE UP (ref 3.5–5.3)
POTASSIUM SERPL-SCNC: 4.3 MMOL/L — SIGNIFICANT CHANGE UP (ref 3.5–5.3)
PROMYELOCYTES # FLD: 0 % — SIGNIFICANT CHANGE UP (ref 0–0)
PROT SERPL-MCNC: 7.2 G/DL — SIGNIFICANT CHANGE UP (ref 6–8.3)
PROT UR-MCNC: 20 — SIGNIFICANT CHANGE UP
PROTHROM AB SERPL-ACNC: 13.5 SEC — HIGH (ref 9.8–13.1)
RBC # BLD: 4.28 M/UL — SIGNIFICANT CHANGE UP (ref 4.2–5.8)
RBC # FLD: 15.4 % — HIGH (ref 10.3–14.5)
RBC CASTS # UR COMP ASSIST: SIGNIFICANT CHANGE UP (ref 0–?)
SAO2 % BLDV: 22.7 % — LOW (ref 60–85)
SODIUM SERPL-SCNC: 139 MMOL/L — SIGNIFICANT CHANGE UP (ref 135–145)
SP GR SPEC: 1.01 — SIGNIFICANT CHANGE UP (ref 1–1.04)
SQUAMOUS # UR AUTO: SIGNIFICANT CHANGE UP
UROBILINOGEN FLD QL: NORMAL — SIGNIFICANT CHANGE UP
VARIANT LYMPHS # BLD: 0 % — SIGNIFICANT CHANGE UP
WBC # BLD: 16.25 K/UL — HIGH (ref 3.8–10.5)
WBC # FLD AUTO: 16.25 K/UL — HIGH (ref 3.8–10.5)
WBC UR QL: SIGNIFICANT CHANGE UP (ref 0–?)

## 2019-08-27 PROCEDURE — 99223 1ST HOSP IP/OBS HIGH 75: CPT

## 2019-08-27 PROCEDURE — 71045 X-RAY EXAM CHEST 1 VIEW: CPT | Mod: 26

## 2019-08-27 PROCEDURE — 70450 CT HEAD/BRAIN W/O DYE: CPT | Mod: 26

## 2019-08-27 RX ORDER — SODIUM CHLORIDE 9 MG/ML
1000 INJECTION INTRAMUSCULAR; INTRAVENOUS; SUBCUTANEOUS ONCE
Refills: 0 | Status: COMPLETED | OUTPATIENT
Start: 2019-08-27 | End: 2019-08-27

## 2019-08-27 RX ORDER — CEFTRIAXONE 500 MG/1
1000 INJECTION, POWDER, FOR SOLUTION INTRAMUSCULAR; INTRAVENOUS ONCE
Refills: 0 | Status: COMPLETED | OUTPATIENT
Start: 2019-08-27 | End: 2019-08-27

## 2019-08-27 RX ORDER — DOCUSATE SODIUM 100 MG
100 CAPSULE ORAL
Refills: 0 | Status: DISCONTINUED | OUTPATIENT
Start: 2019-08-27 | End: 2019-08-30

## 2019-08-27 RX ORDER — GABAPENTIN 400 MG/1
300 CAPSULE ORAL
Refills: 0 | Status: DISCONTINUED | OUTPATIENT
Start: 2019-08-27 | End: 2019-09-07

## 2019-08-27 RX ORDER — FENTANYL CITRATE 50 UG/ML
1 INJECTION INTRAVENOUS
Refills: 0 | Status: DISCONTINUED | OUTPATIENT
Start: 2019-08-27 | End: 2019-08-27

## 2019-08-27 RX ORDER — SENNA PLUS 8.6 MG/1
2 TABLET ORAL AT BEDTIME
Refills: 0 | Status: DISCONTINUED | OUTPATIENT
Start: 2019-08-27 | End: 2019-09-07

## 2019-08-27 RX ORDER — OXYCODONE HYDROCHLORIDE 5 MG/1
20 TABLET ORAL EVERY 4 HOURS
Refills: 0 | Status: DISCONTINUED | OUTPATIENT
Start: 2019-08-27 | End: 2019-08-29

## 2019-08-27 RX ORDER — ENOXAPARIN SODIUM 100 MG/ML
40 INJECTION SUBCUTANEOUS EVERY 24 HOURS
Refills: 0 | Status: DISCONTINUED | OUTPATIENT
Start: 2019-08-27 | End: 2019-09-07

## 2019-08-27 RX ORDER — FENTANYL CITRATE 50 UG/ML
1 INJECTION INTRAVENOUS
Refills: 0 | Status: DISCONTINUED | OUTPATIENT
Start: 2019-08-29 | End: 2019-08-29

## 2019-08-27 RX ORDER — OXYCODONE HYDROCHLORIDE 5 MG/1
5 TABLET ORAL ONCE
Refills: 0 | Status: DISCONTINUED | OUTPATIENT
Start: 2019-08-27 | End: 2019-08-27

## 2019-08-27 RX ORDER — ACETAMINOPHEN 500 MG
650 TABLET ORAL ONCE
Refills: 0 | Status: COMPLETED | OUTPATIENT
Start: 2019-08-27 | End: 2019-08-27

## 2019-08-27 RX ORDER — LEVOTHYROXINE SODIUM 125 MCG
25 TABLET ORAL DAILY
Refills: 0 | Status: DISCONTINUED | OUTPATIENT
Start: 2019-08-27 | End: 2019-09-07

## 2019-08-27 RX ORDER — MORPHINE SULFATE 50 MG/1
4 CAPSULE, EXTENDED RELEASE ORAL ONCE
Refills: 0 | Status: DISCONTINUED | OUTPATIENT
Start: 2019-08-27 | End: 2019-08-27

## 2019-08-27 RX ADMIN — CEFTRIAXONE 100 MILLIGRAM(S): 500 INJECTION, POWDER, FOR SOLUTION INTRAMUSCULAR; INTRAVENOUS at 12:07

## 2019-08-27 RX ADMIN — OXYCODONE HYDROCHLORIDE 5 MILLIGRAM(S): 5 TABLET ORAL at 04:26

## 2019-08-27 RX ADMIN — MORPHINE SULFATE 4 MILLIGRAM(S): 50 CAPSULE, EXTENDED RELEASE ORAL at 06:25

## 2019-08-27 RX ADMIN — MORPHINE SULFATE 4 MILLIGRAM(S): 50 CAPSULE, EXTENDED RELEASE ORAL at 06:58

## 2019-08-27 RX ADMIN — GABAPENTIN 300 MILLIGRAM(S): 400 CAPSULE ORAL at 22:07

## 2019-08-27 RX ADMIN — SODIUM CHLORIDE 1000 MILLILITER(S): 9 INJECTION INTRAMUSCULAR; INTRAVENOUS; SUBCUTANEOUS at 00:25

## 2019-08-27 RX ADMIN — OXYCODONE HYDROCHLORIDE 5 MILLIGRAM(S): 5 TABLET ORAL at 03:28

## 2019-08-27 RX ADMIN — Medication 650 MILLIGRAM(S): at 07:50

## 2019-08-27 RX ADMIN — Medication 100 MILLIGRAM(S): at 21:30

## 2019-08-27 RX ADMIN — Medication 650 MILLIGRAM(S): at 12:03

## 2019-08-27 NOTE — ED ADULT NURSE NOTE - NSIMPLEMENTINTERV_GEN_ALL_ED
Implemented All Fall with Harm Risk Interventions:  [place] to call system. Call bell, personal items and telephone within reach. Instruct patient to call for assistance. Room bathroom lighting operational. Non-slip footwear when patient is off stretcher. Physically safe environment: no spills, clutter or unnecessary equipment. Stretcher in lowest position, wheels locked, appropriate side rails in place. Provide visual cue, wrist band, yellow gown, etc. Monitor gait and stability. Monitor for mental status changes and reorient to person, place, and time. Review medications for side effects contributing to fall risk. Reinforce activity limits and safety measures with patient and family. Provide visual clues: red socks.

## 2019-08-27 NOTE — H&P ADULT - PROBLEM SELECTOR PLAN 2
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  - Oncology consulted, appreciate recs  - Per oncology, further evaluate with MRI head/neck

## 2019-08-27 NOTE — H&P ADULT - ASSESSMENT
54 year old male with nasopharnygeal carcinoma (diagnosed 10/18 s/p biopsy, radiation, cisplatin followed by adjuvant cisplatin and 5FU from 11/18 to 5/19, tumor refractory/resistant to chemo/radiation, now receiving on C2 pembrolizumab started 8/23/19), presents with 3-4 days of increased falls, generalized weakness admitted for further workup and evaluation.

## 2019-08-27 NOTE — ED PROVIDER NOTE - OBJECTIVE STATEMENT
54M, reported med hx of stage 3 nasopharyngeal Ca on immunotherapy (at [facility]), HYPOthyroid, presents w wife with chief complaint of failure to thrive, AMS, falls, weakness. Per wife, patient last had immuno therapy on Friday. Since that time, has had worsening weakness, as well as limited PO intake. Per wife, patient has been having intermittent falls due to weakness, but over the last 3-4 days, has fallen >20 times, with multiple episodes of hitting head, and unknown if patient had LOC. Wife also reports that patient had episode of AMS and visual hallucinations yesterday (pt does not recall event). Patient currently awake, alert, oriented x4. States that he feels 'slower' than normal, and weaker overall. Denies any fevers or chills, nausea or vomiting, headache, dizziness, lightheadedness, blurry vision, chest pain, shortness of breath, cough, abdominal pain, dysuria, hematuria, diarrhea or constipation, neck or back pain, pain to extremities, numbness to extremities, incontinence. Patient states he is not sure why he keeps falling, but for some episodes, his legs have given out from under him. Per wife, meds include immunotherapy, synthroid, gabapentin, fentanyl, oxycodone, unsure about others. Denies blood thinners. Former smoker. No PMD, sees physician at [facility].

## 2019-08-27 NOTE — H&P ADULT - PROBLEM SELECTOR PLAN 1
- Nutrition and PT evaluation - Nutrition and PT evaluation  - evaluate for progression of cancer with MRI (see below)

## 2019-08-27 NOTE — ED PROVIDER NOTE - PROGRESS NOTE DETAILS
Jose A PGY-2:  signout from Dr. rivera pgy3: Pt w/ "fogginess of head" however a x O x 4 and no issuess walking or talking, CT head if negative reassess and possible D/C or more services to be offered if family seems unable to take care of pt Signed out to day team, pending CT head to be performed. Ambulatory unassisted in ED. No distress. Dispo pending CT Head and day team re-assessment  Gareth Lambert MD, PGY3 Emergency Medicine Sign out follow-up: Labs and UA non-actionable. Pt has been ambulating around ED and is at baseline mental status per family. CTH pending ([facility] currently corrdinating with CT tech as there has been a 7+ hr wait for CT). Discussed rehab placement vs home PT/OT and health aid services. Pt and daughter report they would prefer home services. PT and case management in [place] [facility]. Dr. Hogan: pt signed out to me at 8 AM from Dr. Almaraz with plan to follow up CT--CT showing continued nasopharyngeal cancer, somewhat worsened compared to most recent MRI (case discussed with radiology, difficult to compare CT to MRI, but grossly appears somewhat more advanced today than on most recent MRI), also cannot exclude infection as per scan, and pt not on prednisone so no known cause for elevated WBC; discussed findings with pt and family at bedside, pt adamant that he would like to go home Dr. Hogan: pt signed out to me at 8 AM from Dr. Almaraz with plan to follow up CT--CT showing continued nasopharyngeal cancer, somewhat worsened compared to most recent MRI (case discussed with radiology, difficult to compare CT to MRI, but grossly appears somewhat more advanced today than on most recent MRI), also cannot exclude infection as per scan, and pt not on prednisone so no known cause for elevated WBC; discussed findings with pt and family at bedside, pt adamant that he would like to go home; spoke to radiology, possible lung nodule on CXR, recommending CT chest; oncology to see pt in ED, will discuss with them Dr. Hogan: spoke to pt's family at bedside, aware of wait for oncology, would like to start 1 dose of abx in ED Dr. Hogan: pt seen by oncology attending and fellow at bedside, discussed case with pt and agree with admission; oncology aware of CXR finding and recommendation for CT chest, will order and pursue as inpatient Jose A PGY-2:  Dr. Dukes onc attg states pt needs admission for FTT in context of malignancy, oncology will follow , pt agreeable. will admit

## 2019-08-27 NOTE — H&P ADULT - NSHPPHYSICALEXAM_GEN_ALL_CORE
Vital Signs Last 24 Hrs  T(C): 36.7 (27 Aug 2019 16:17), Max: 36.8 (27 Aug 2019 01:57)  T(F): 98 (27 Aug 2019 16:17), Max: 98.3 (27 Aug 2019 01:57)  HR: 92 (27 Aug 2019 16:17) (81 - 95)  BP: 115/89 (27 Aug 2019 16:17) (115/89 - 157/86)  BP(mean): --  RR: 17 (27 Aug 2019 16:17) (16 - 18)  SpO2: 100% (27 Aug 2019 16:17) (95% - 100%)    Physical Exam  Gen: laying in bed in NAD  Neuro: Alert, no focal deficits  HEENT: MMM, EOMI   Pulm: clear bilaterally without wheezing  CV: regular rate, nl s1, s2, no murmurs appreciated  Abd: Soft, nontender, nondistended, normoactive BS  Ext: Warm, no significant edema

## 2019-08-27 NOTE — H&P ADULT - NSHPLABSRESULTS_GEN_ALL_CORE
LABS:                        8.4    16.25 )-----------( 482      ( 27 Aug 2019 00:20 )             28.8         139  |  92<L>  |  28<H>  ----------------------------<  118<H>  4.3   |  35<H>  |  1.26    Ca    10.5      27 Aug 2019 00:20    TPro  7.2  /  Alb  3.4  /  TBili  0.3  /  DBili  x   /  AST  20  /  ALT  16  /  AlkPhos  59      LIVER FUNCTIONS - ( 27 Aug 2019 00:20 )  Alb: 3.4 g/dL / Pro: 7.2 g/dL / ALK PHOS: 59 u/L / ALT: 16 u/L / AST: 20 u/L / GGT: x     / T. Bili 0.3 mg/dL / D. Bili x         PT/INR - ( 27 Aug 2019 00:20 )   PT: 13.5 SEC;   INR: 1.18          PTT - ( 27 Aug 2019 00:20 )  PTT:32.0 SEC  Urinalysis Basic - ( 27 Aug 2019 05:00 )    Color: LIGHT YELLOW / Appearance: Lt TURBID / S.015 / pH: 7.5  Gluc: NEGATIVE / Ketone: TRACE  / Bili: NEGATIVE / Urobili: NORMAL   Blood: NEGATIVE / Protein: 20 / Nitrite: NEGATIVE   Leuk Esterase: NEGATIVE / RBC: 3-5 / WBC 3-5   Sq Epi: OCC / Non Sq Epi: x / Bacteria: NEGATIVE    < from: Xray Chest 1 View- PORTABLE-Urgent (19 @ 01:10) >  IMPRESSION:  Right chest wall CTcompatible power infusion port present with tip   terminating in distal SVC region.  Suggestion of a rounded nodular opacity in peripheral right upper lung   partially obscured by the overlying infusion port concerning for   metastatic disease, noncontrast chest CT advised to further assess. Clear   remaining visualized lungs. No pleural effusions or pneumothorax.  Cardiac and mediastinal silhouettes within normal limits.  Trachea midline.  Unremarkable osseous structures.  Discussed with Dr. Hogan in the ED on 2019 at 1110  with read back.  FRANSISCA VELEZ M.D., RADIOLOGY RESIDENT  This document has been electronically signed.  ИРИНА TOTH M.D., ATTENDING RADIOLOGIST  This document has been electronically signed. Aug 27 2019 11:13AM  < end of copied text >      Oncology consult note reviewed  Care Discussed with Oncology Consult Fellow

## 2019-08-27 NOTE — PROVIDER CONTACT NOTE (OTHER) - ASSESSMENT
Patient noted to have difficulty swallowing oral medications. Risk for aspiration noted. Dysphagia screening failed.

## 2019-08-27 NOTE — CONSULT NOTE ADULT - ASSESSMENT
55 yo M with locally advanced nasopharyngeal carcinoma (dx 3/2018) s/p cis/RT followed by adjuvant cisplatin with POD (3/2019) recently started pembrolizumab (C2 on 8/23) p/w FTT, AMS, and falls.     1. FTT: likely 2/2 malignancy  - CT head with no ICH  - afebrile, no localizing infectious symptoms  - PT and nutrition eval  - check TSH    2. Locally advanced nasopharyngeal carcinoma:  - most recently on pembrolizumab, received C2 on 8/23  - recommend MRI head and neck to evaluate disease status  - if progressive disease, will likely need to address goals of care  - follows with Dr. Leon at [facility]    d/w primary team    Lennie Bullock, PGY-6  Hematology-Oncology Fellow  Pager: [phone] ([facility]), [phone] ([facility])  (After 5 pm or on weekends please page the on-call fellow) 53 yo M with locally advanced nasopharyngeal carcinoma (dx 3/2018) s/p cis/RT followed by adjuvant cisplatin with POD (3/2019) recently started pembrolizumab (C2 on 8/23) p/w FTT, AMS, and falls.     1. FTT: likely 2/2 malignancy  - CT head with no ICH  - afebrile, no localizing infectious symptoms  - PT and nutrition eval  - check TSH    2. Locally advanced nasopharyngeal carcinoma:  - most recently on pembrolizumab, received C2 on 8/23  - recommend MRI head and neck to evaluate disease status  - if progressive disease, will likely need to address goals of care  - d/w pt and he is agreeable for admission  - follows with Dr. Leon at [facility]    d/w family and primary team    Lennie Bullock, PGY-6  Hematology-Oncology Fellow  Pager: [phone] ([facility]), [phone] ([facility])  (After 5 pm or on weekends please page the on-call fellow)

## 2019-08-27 NOTE — ED ADULT NURSE NOTE - CHIEF COMPLAINT QUOTE
Pt comes in from home for reported FTT and reported AMS yesterday per wife. Pt appeared to be hallucinating then, today a/o x4. Pt denies CP SOB, is a CA pt at [facility] and brought in for evaluation. Pt noted with blood tinged drainage from nose, but wife reports that is normal.

## 2019-08-27 NOTE — H&P ADULT - HISTORY OF PRESENT ILLNESS
54 year old male with nasopharnygeal carcinoma (symptoms began 3/18, biopsy 10/18 revealing non-keratinizing, differentiated nasopharyngeal carcinoma s/p radiation with cisplatin followed by adjuvant cisplatin and 5FU from 11/18 to 5/19 at [facility], now established care with oncologist Dr. Leon, per oncology, tumor refractory/resistant to chemo/radiation, now receiving pembrolizumab), presents with 3-4 days of increased falls, generalized weakness, and decreased PO intake. Last immunotherapy reported to be on 8/23/19. Patient currently reports headache, nonradiating, without any nausea, vomiting. No other recent change in symptoms per patient, denies fevers, chills, recent illnesses, difficulty breathing, cough, or chest pain. 54 year old male with nasopharnygeal carcinoma (symptoms began 3/18, biopsy 10/18 revealing non-keratinizing, differentiated nasopharyngeal carcinoma s/p radiation with cisplatin followed by adjuvant cisplatin and 5FU from 11/18 to 5/19 at [facility], now established care with oncologist Dr. Leon, per oncology, tumor refractory/resistant to chemo/radiation, now receiving pembrolizumab, cycle 2 on 8/23/19), presents with 3-4 days of increased falls, patient denies head trauma, generalized weakness, and decreased PO intake. Last immunotherapy reported to be on 8/23/19. Patient currently reports headache, nonradiating, without any nausea, vomiting. No other recent change in symptoms per patient, denies fevers, chills, recent illnesses, difficulty breathing, cough, or chest pain.

## 2019-08-27 NOTE — ED ADULT NURSE NOTE - OBJECTIVE STATEMENT
pt received in room 3, A&Ox2-3 currently, accompanied by wife who gives most of history. Pt has hx of nasopharyngeal cancer, currently on immunotherapy (has R chest wall port), last treatment Friday. Wife brings pt in today for increased AMS and failure to thrive symptoms. Since Friday, pt has had increased weakness, decreased PO intake, and >20 falls with hitting head, unknown if LOC. Wife also states yesterday had visual hallucinations, pt is unable to recall. Pt sometimes appears tachypneic with heavy breathing, wife states this is baseline d/t cancer. Also pt has dried blood on nares, also normal per wife. Respirations currently even and unlabored. Denies CP, SOB, N/V/D, fevers. When assessing pt, pt seeming to not make sense. Abd soft non distended non tender. 20G IV placed to L AC. Labs drawn and sent. Will continue to monitor.

## 2019-08-27 NOTE — ED PROVIDER NOTE - NS ED ROS FT
Please see HPI section of chart for further detailed Review of Systems    frequent falls, hallucinations, weakness, poor PO, FTT

## 2019-08-27 NOTE — H&P ADULT - NSHPREVIEWOFSYSTEMS_GEN_ALL_CORE
Constitutional: no recent fevers, night sweats, +fatigue  Dermatologic: no lesions or rashes.  HEENT: denies visual changes, +ear fullness and congestion which have been ongoing for several months  Cardiovascular: denies palpitations, chest pain, edema  Respiratory: denies SOB, wheezing  Gastrointestinal: denies N/V/D, abdominal pain, hematochezia, melena  : denies dysuria, hematuria  MSK: + generalized weakness, no joint pain  Endocrine: no cold or heat intolerance or excessive thirst or urination  Hematologic: no excessive bleeding or clotting  Neurologic: +headaches, no vision changes, +dizziness, no numbness, tingling

## 2019-08-27 NOTE — CONSULT NOTE ADULT - SUBJECTIVE AND OBJECTIVE BOX
HPI:      PAST MEDICAL & SURGICAL HISTORY:  Nasopharyngeal cancer  Smoker  Hypercholesterolemia  Status post cataract extraction: left eye      Allergies    No Known Drug Allergies  shellfish (Unknown)    Intolerances        MEDICATIONS  (STANDING):    MEDICATIONS  (PRN):      FAMILY HISTORY:      SOCIAL HISTORY: No EtOH, no tobacco    REVIEW OF SYSTEMS:    CONSTITUTIONAL: No fevers, no chills, no weakness, no weight loss  EYES/ENT: No visual changes;  No dizziness/vertigo or throat pain   NECK: No pain or stiffness  RESPIRATORY: No shortness of breath, no cough or wheezing   CARDIOVASCULAR: No chest pain or palpitations, no dyspnea on exertion, no peripheral edema  GASTROINTESTINAL: No nausea/vomiting/diarrhea, no abdominal pain, no melena or BRBPR, no constipation  GENITOURINARY: No dysuria, increased frequency or hematuria  NEUROLOGICAL: No numbness or weakness  SKIN: No rashes, no bruises, no petechiae  LYMPH: No enlarged lymph nodes  All other review of systems is negative unless indicated above        VITALS:   T(F): 98.3 (08-27-19 @ 12:08), Max: 98.3 (08-27-19 @ 01:57)  HR: 92 (08-27-19 @ 12:08)  BP: 118/86 (08-27-19 @ 12:08)  RR: 16 (08-27-19 @ 12:08)  SpO2: 100% (08-27-19 @ 12:08)  Wt(kg): --      PHYSICAL EXAM:  GENERAL: resting in bed comfortably  EYES: EOMI, conjunctiva and sclera clear  NECK: supple  RESP: CTAB  HEART: RRR, S1/S2  ABDOMEN: +BS, soft, NT, ND  EXTREMITIES: no LE edema  NEUROLOGIC: no focal deficits, AAO x 3  SKIN: warm and dry, no rashes, no bruises or petechiae  LYMPH: No peripheral lymphadenopathy appreciated      LABS:                         8.4    16.25 )-----------( 482      ( 27 Aug 2019 00:20 )             28.8       08-27    139  |  92<L>  |  28<H>  ----------------------------<  118<H>  4.3   |  35<H>  |  1.26    Ca    10.5      27 Aug 2019 00:20    TPro  7.2  /  Alb  3.4  /  TBili  0.3  /  DBili  x   /  AST  20  /  ALT  16  /  AlkPhos  59  08-27        PT/INR - ( 27 Aug 2019 00:20 )   PT: 13.5 SEC;   INR: 1.18          PTT - ( 27 Aug 2019 00:20 )  PTT:32.0 SEC    IMAGING: HPI: 53 yo M with locally advanced nasopharyngeal carcinoma (dx 3/2018) s/p cis/RT followed by adjuvant cisplatin with POD (3/2019) recently started pembrolizumab (C2 on 8/23) p/w FTT, AMS, and falls.     Per family, patient last had immunotherapy on Friday. Since that time, has had worsening weakness, as well as limited po intake. He has also been having intermittent falls due to weakness, but over the last 3-4 days, has fallen >20 times, with multiple episodes of hitting head, and unknown if patient had LOC. Family also reporting episodes of AMS and visual hallucinations yesterday (pt does not recall event). Pt currently reports feeling weak, has had poor po intake, and complaining of a headache. Denies any fevers or chills, nausea or vomiting, no dizziness, no blurry vision. No chest pain, shortness of breath, or cough. No abdominal pain, dysuria, hematuria, diarrhea or constipation. No back pain or LE weakness, no incontinence. Patient states he is not sure why he keeps falling, but for some episodes, his legs have given out from under him.     PAST MEDICAL & SURGICAL HISTORY:  Nasopharyngeal cancer  Smoker  Hypercholesterolemia  Status post cataract extraction: left eye    Allergies    No Known Drug Allergies  shellfish (Unknown)    Intolerances    MEDICATIONS  (STANDING):    MEDICATIONS  (PRN):    FAMILY HISTORY:    REVIEW OF SYSTEMS: see HPI  All other review of systems is negative unless indicated above    VITALS:   T(F): 98.3 (08-27-19 @ 12:08), Max: 98.3 (08-27-19 @ 01:57)  HR: 92 (08-27-19 @ 12:08)  BP: 118/86 (08-27-19 @ 12:08)  RR: 16 (08-27-19 @ 12:08)  SpO2: 100% (08-27-19 @ 12:08)  Wt(kg): --    PHYSICAL EXAM:  GENERAL: sitting in a chair  HEENT: rhinorrhea, EOMI  RESP: CTAB  HEART: RRR, S1/S2  ABDOMEN: +BS, soft, NT, ND  EXTREMITIES: no LE edema  NEUROLOGIC: AAO x 3, dysarthric and difficult to understand speech  SKIN: warm and dry, no rashes, no bruises or petechiae    LABS:                         8.4    16.25 )-----------( 482      ( 27 Aug 2019 00:20 )             28.8   08-27    139  |  92<L>  |  28<H>  ----------------------------<  118<H>  4.3   |  35<H>  |  1.26    Ca    10.5      27 Aug 2019 00:20    TPro  7.2  /  Alb  3.4  /  TBili  0.3  /  DBili  x   /  AST  20  /  ALT  16  /  AlkPhos  59  08-27  PT/INR - ( 27 Aug 2019 00:20 )   PT: 13.5 SEC;   INR: 1.18     PTT - ( 27 Aug 2019 00:20 )  PTT:32.0 SEC    IMAGING:  - CT Head No Cont (08.27.19 @ 07:48) >  Extensive soft tissue mass in the adenoid region with involvement of the sphenoid sinus and possibly the bilateral ethmoid and left frontal sinuses. Opacification of the left mastoid and middle ear regions seen as well.

## 2019-08-27 NOTE — ED PROVIDER NOTE - CLINICAL SUMMARY MEDICAL DECISION MAKING FREE TEXT BOX
54M, reported med hx of stage 3 nasopharyngeal Ca on immunotherapy (at [facility]), HYPOthyroid, presents w wife with chief complaint of failure to thrive, AMS, falls, weakness. No infectious sx. Exam as above. Will obtain labs, UA, CT head, CXR. Will likely require admit given FTT with frequent calls Currently stable, no acute distress. Will continue to follow up and re-assess. Case discussed with Attending.  Gareth Lambert MD, PGY3 Emergency Medicine Pradeep Grullon DO: 53 yo M pmh stage 3 nasopharyngeal Ca on immunotherapy (at [facility]), HYPOthyroidism presents with wife for concern of progressive AMS, falls, generalized weakness/deconditioning and poor PO intake within the past week. No reported infectious symptoms. Exam as above. suspect related to progressive cancer, will assess for ICH, electrolyte abnormalities. Plan: labs, CT head, CXR. likely admit.

## 2019-08-27 NOTE — ED PROVIDER NOTE - PHYSICAL EXAMINATION
General: Alert, oriented, no acute distress. Resting in bed. Thin. Speaking slowly, with dysarthcia at baseline per wife.   HEENT: PERRLA EOMI. No significant trauma/bruising noted to head or face. No lip/tongue/throat swelling noted on exam. No tenderness to palpation to facial bones. Nasal drip noted, baseline per wife.   CV: Regular rate and rhythm, S1/S2, no murmurs/rubs/gallops noted on exam. No tenderness to palpation to chest wall.  Lungs: Clear to ascultation bilaterally, no wheezes/crackles/rales noted on exam. Equal chest wall excursion noted.   Abdomen: Soft, non tender, non distended, no guarding or rebound. No CVA tenderness to palpation.   MSK: Full ROM of upper and lower extremities bilaterally. No tenderness to palpation to extremities. Full ROM of neck. No C-spine or spinal bony tenderness to palpation. No gross deformities noted to extremities.  Neuro: Awake, A+O x4, moving all extremities spontaneously. CN 2-12 grossly intact. No nystagmus noted. Strength and sensation grossly intact to all extremities.  Extremities: No swelling or edema noted to extremities. No calf tenderness to palpation.   Skin: No rash or bruising noted on exam. General: Alert, oriented, no acute distress. Resting in bed. Thin. Speaking slowly, with dysarthcia at baseline per wife.   HEENT: PERRLA EOMI. No significant trauma/bruising noted to head or face. No lip/tongue/throat swelling noted on exam. No tenderness to palpation to facial bones. Nasal drip noted, baseline per wife.   CV: Regular rate and rhythm, S1/S2, no murmurs/rubs/gallops noted on exam. No tenderness to palpation to chest wall.  Lungs: Clear to ascultation bilaterally, no wheezes/crackles/rales noted on exam. Equal chest wall excursion noted.   Abdomen: Soft, non tender, non distended, no guarding or rebound. No CVA tenderness to palpation.   MSK: Full ROM of upper and lower extremities bilaterally. No tenderness to palpation to extremities. Full ROM of neck. No C-spine or spinal bony tenderness to palpation. No gross deformities noted to extremities.  Neuro: Awake, A+O x3-4, moving all extremities spontaneously. CN 2-12 grossly intact. No nystagmus noted. Strength and sensation grossly intact to all extremities. Following commands. Speaking slowly, dysarthric.   Extremities: No swelling or edema noted to extremities. No calf tenderness to palpation.   Skin: No rash or bruising noted on exam. General: Alert, oriented, no acute distress. Resting in bed. Thin. Speaking slowly, with dysarthria( at baseline per wife).   HEENT: PERRLA EOMI. No significant trauma/bruising noted to head or face. No lip/tongue/throat swelling noted on exam. No tenderness to palpation to facial bones. Nasal drip noted [baseline per wife]  CV: Regular rate and rhythm, S1/S2, no murmurs/rubs/gallops noted on exam. No tenderness to palpation to chest wall.  Lungs: Clear to ascultation bilaterally, no wheezes/crackles/rales noted on exam. Equal chest wall excursion noted.   Abdomen: Soft, non tender, non distended, no guarding or rebound. No CVA tenderness to palpation.   MSK: Full ROM of upper and lower extremities bilaterally. No tenderness to palpation to extremities. Full ROM of neck. No C-spine or spinal bony tenderness to palpation. No gross deformities noted to extremities.  Neuro: Awake, A,O x3 moving all extremities spontaneously. CN 2-12 grossly intact. No nystagmus noted. Strength and sensation grossly intact to all extremities. Following commands. Speaking slowly, dysarthric.   Extremities: No swelling or edema noted to extremities. No calf tenderness to palpation.   Skin: No rash or bruising noted on exam.

## 2019-08-28 LAB
ALBUMIN SERPL ELPH-MCNC: 3.3 G/DL — SIGNIFICANT CHANGE UP (ref 3.3–5)
ALP SERPL-CCNC: 59 U/L — SIGNIFICANT CHANGE UP (ref 40–120)
ALT FLD-CCNC: 15 U/L — SIGNIFICANT CHANGE UP (ref 4–41)
ANION GAP SERPL CALC-SCNC: 18 MMO/L — HIGH (ref 7–14)
AST SERPL-CCNC: 20 U/L — SIGNIFICANT CHANGE UP (ref 4–40)
BASOPHILS # BLD AUTO: 0.05 K/UL — SIGNIFICANT CHANGE UP (ref 0–0.2)
BASOPHILS NFR BLD AUTO: 0.3 % — SIGNIFICANT CHANGE UP (ref 0–2)
BILIRUB DIRECT SERPL-MCNC: < 0.2 MG/DL — SIGNIFICANT CHANGE UP (ref 0.1–0.2)
BILIRUB SERPL-MCNC: 0.3 MG/DL — SIGNIFICANT CHANGE UP (ref 0.2–1.2)
BUN SERPL-MCNC: 24 MG/DL — HIGH (ref 7–23)
CALCIUM SERPL-MCNC: 9.6 MG/DL — SIGNIFICANT CHANGE UP (ref 8.4–10.5)
CHLORIDE SERPL-SCNC: 94 MMOL/L — LOW (ref 98–107)
CO2 SERPL-SCNC: 29 MMOL/L — SIGNIFICANT CHANGE UP (ref 22–31)
CREAT SERPL-MCNC: 0.97 MG/DL — SIGNIFICANT CHANGE UP (ref 0.5–1.3)
EOSINOPHIL # BLD AUTO: 0.03 K/UL — SIGNIFICANT CHANGE UP (ref 0–0.5)
EOSINOPHIL NFR BLD AUTO: 0.2 % — SIGNIFICANT CHANGE UP (ref 0–6)
GLUCOSE SERPL-MCNC: 88 MG/DL — SIGNIFICANT CHANGE UP (ref 70–99)
HCT VFR BLD CALC: 32.6 % — LOW (ref 39–50)
HCV AB S/CO SERPL IA: 0.19 S/CO — SIGNIFICANT CHANGE UP (ref 0–0.99)
HCV AB SERPL-IMP: SIGNIFICANT CHANGE UP
HGB BLD-MCNC: 9.2 G/DL — LOW (ref 13–17)
IMM GRANULOCYTES NFR BLD AUTO: 0.5 % — SIGNIFICANT CHANGE UP (ref 0–1.5)
LYMPHOCYTES # BLD AUTO: 0.72 K/UL — LOW (ref 1–3.3)
LYMPHOCYTES # BLD AUTO: 4 % — LOW (ref 13–44)
MAGNESIUM SERPL-MCNC: 1.7 MG/DL — SIGNIFICANT CHANGE UP (ref 1.6–2.6)
MCHC RBC-ENTMCNC: 19.3 PG — LOW (ref 27–34)
MCHC RBC-ENTMCNC: 28.2 % — LOW (ref 32–36)
MCV RBC AUTO: 68.3 FL — LOW (ref 80–100)
MONOCYTES # BLD AUTO: 0.9 K/UL — SIGNIFICANT CHANGE UP (ref 0–0.9)
MONOCYTES NFR BLD AUTO: 5 % — SIGNIFICANT CHANGE UP (ref 2–14)
NEUTROPHILS # BLD AUTO: 16.15 K/UL — HIGH (ref 1.8–7.4)
NEUTROPHILS NFR BLD AUTO: 90 % — HIGH (ref 43–77)
NRBC # FLD: 0 K/UL — SIGNIFICANT CHANGE UP (ref 0–0)
PLATELET # BLD AUTO: 502 K/UL — HIGH (ref 150–400)
PMV BLD: 9.9 FL — SIGNIFICANT CHANGE UP (ref 7–13)
POTASSIUM SERPL-MCNC: 3.6 MMOL/L — SIGNIFICANT CHANGE UP (ref 3.5–5.3)
POTASSIUM SERPL-SCNC: 3.6 MMOL/L — SIGNIFICANT CHANGE UP (ref 3.5–5.3)
PROT SERPL-MCNC: 7.3 G/DL — SIGNIFICANT CHANGE UP (ref 6–8.3)
RBC # BLD: 4.77 M/UL — SIGNIFICANT CHANGE UP (ref 4.2–5.8)
RBC # FLD: 15.8 % — HIGH (ref 10.3–14.5)
SODIUM SERPL-SCNC: 141 MMOL/L — SIGNIFICANT CHANGE UP (ref 135–145)
TSH SERPL-MCNC: 1.01 UIU/ML — SIGNIFICANT CHANGE UP (ref 0.27–4.2)
WBC # BLD: 17.94 K/UL — HIGH (ref 3.8–10.5)
WBC # FLD AUTO: 17.94 K/UL — HIGH (ref 3.8–10.5)

## 2019-08-28 PROCEDURE — 99233 SBSQ HOSP IP/OBS HIGH 50: CPT

## 2019-08-28 PROCEDURE — 70553 MRI BRAIN STEM W/O & W/DYE: CPT | Mod: 26

## 2019-08-28 PROCEDURE — 99232 SBSQ HOSP IP/OBS MODERATE 35: CPT | Mod: GC

## 2019-08-28 RX ORDER — KETOROLAC TROMETHAMINE 30 MG/ML
15 SYRINGE (ML) INJECTION ONCE
Refills: 0 | Status: DISCONTINUED | OUTPATIENT
Start: 2019-08-28 | End: 2019-08-28

## 2019-08-28 RX ADMIN — Medication 15 MILLIGRAM(S): at 17:31

## 2019-08-28 RX ADMIN — Medication 15 MILLIGRAM(S): at 18:24

## 2019-08-28 RX ADMIN — ENOXAPARIN SODIUM 40 MILLIGRAM(S): 100 INJECTION SUBCUTANEOUS at 05:49

## 2019-08-28 NOTE — DIETITIAN INITIAL EVALUATION ADULT. - ADD RECOMMEND
1. Once/when PO diet initiated, encourage/assist Pt with meals; Monitor PO diet tolerance; Honor food preferences;        2. If Pt to remain NPO, suggest initiating alternative means of nutrition support;           3. Monitor labs, hydration status;

## 2019-08-28 NOTE — SWALLOW BEDSIDE ASSESSMENT ADULT - COMMENTS
Patient seen for swallowing evaluation this morning.  Patient had difficulty on 8/27 with medication and RN was concerned so MD team made him NPO and ordered a bedside swallow evaluation.

## 2019-08-28 NOTE — SWALLOW BEDSIDE ASSESSMENT ADULT - ASR SWALLOW REFERRAL
Registered dietician consult to ensure patient maintains their nutritional/hydration/caloric needs via non-oral diet./Registered Dietitian

## 2019-08-28 NOTE — PROGRESS NOTE ADULT - SUBJECTIVE AND OBJECTIVE BOX
Patient is a 54y old  Male who presents with a chief complaint of falls x 3-4 days (27 Aug 2019 16:29)    SUBJECTIVE / OVERNIGHT EVENTS:  **INCOMPLETE**    MEDICATIONS  (STANDING):  docusate sodium 100 milliGRAM(s) Oral two times a day  enoxaparin Injectable 40 milliGRAM(s) SubCutaneous every 24 hours  gabapentin   Solution 300 milliGRAM(s) Oral two times a day  levothyroxine 25 MICROGram(s) Oral daily  senna 2 Tablet(s) Oral at bedtime    MEDICATIONS  (PRN):  oxyCODONE    IR 20 milliGRAM(s) Oral every 4 hours PRN Pain      Vital Signs Last 24 Hrs  T(C): 36.7 (28 Aug 2019 05:30), Max: 36.9 (27 Aug 2019 21:38)  T(F): 98 (28 Aug 2019 05:30), Max: 98.5 (27 Aug 2019 21:38)  HR: 80 (28 Aug 2019 05:30) (80 - 100)  BP: 129/85 (28 Aug 2019 05:30) (115/89 - 129/85)  BP(mean): --  RR: 19 (28 Aug 2019 05:30) (16 - 19)  SpO2: 100% (28 Aug 2019 05:30) (99% - 100%)  CAPILLARY BLOOD GLUCOSE        I&O's Summary        PHYSICAL EXAM  GENERAL: NAD, well-developed  HEAD:  Atraumatic, Normocephalic  EYES: EOMI, PERRLA, conjunctiva and sclera clear  NECK: Supple, No JVD  CHEST/LUNG: Clear to auscultation bilaterally; No wheeze  HEART: Regular rate and rhythm; No murmurs, rubs, or gallops  ABDOMEN: Soft, Nontender, Nondistended; Bowel sounds present  EXTREMITIES:  2+ Peripheral Pulses, No clubbing, cyanosis, or edema  PSYCH: AAOx3  SKIN: No rashes or lesions    LABS:                        9.2    17.94 )-----------( 502      ( 28 Aug 2019 05:40 )             32.6     28    141  |  94<L>  |  24<H>  ----------------------------<  88  3.6   |  29  |  0.97    Ca    9.6      28 Aug 2019 05:40  Mg     1.7         TPro  7.3  /  Alb  3.3  /  TBili  0.3  /  DBili  < 0.2  /  AST  20  /  ALT  15  /  AlkPhos  59      PT/INR - ( 27 Aug 2019 00:20 )   PT: 13.5 SEC;   INR: 1.18          PTT - ( 27 Aug 2019 00:20 )  PTT:32.0 SEC      Urinalysis Basic - ( 27 Aug 2019 05:00 )    Color: LIGHT YELLOW / Appearance: Lt TURBID / S.015 / pH: 7.5  Gluc: NEGATIVE / Ketone: TRACE  / Bili: NEGATIVE / Urobili: NORMAL   Blood: NEGATIVE / Protein: 20 / Nitrite: NEGATIVE   Leuk Esterase: NEGATIVE / RBC: 3-5 / WBC 3-5   Sq Epi: OCC / Non Sq Epi: x / Bacteria: NEGATIVE          RADIOLOGY & ADDITIONAL TESTS:    Imaging Personally Reviewed:  Consultant(s) Notes Reviewed:    Care Discussed with Consultants/Other Providers: Patient is a 54y old  Male who presents with a chief complaint of falls x 3-4 days (27 Aug 2019 16:29)    SUBJECTIVE / OVERNIGHT EVENTS:  Patient seen denying any complaints.     MEDICATIONS  (STANDING):  docusate sodium 100 milliGRAM(s) Oral two times a day  enoxaparin Injectable 40 milliGRAM(s) SubCutaneous every 24 hours  gabapentin   Solution 300 milliGRAM(s) Oral two times a day  levothyroxine 25 MICROGram(s) Oral daily  senna 2 Tablet(s) Oral at bedtime    MEDICATIONS  (PRN):  oxyCODONE    IR 20 milliGRAM(s) Oral every 4 hours PRN Pain      Vital Signs Last 24 Hrs  T(C): 36.7 (28 Aug 2019 05:30), Max: 36.9 (27 Aug 2019 21:38)  T(F): 98 (28 Aug 2019 05:30), Max: 98.5 (27 Aug 2019 21:38)  HR: 80 (28 Aug 2019 05:30) (80 - 100)  BP: 129/85 (28 Aug 2019 05:30) (115/89 - 129/85)  BP(mean): --  RR: 19 (28 Aug 2019 05:30) (16 - 19)  SpO2: 100% (28 Aug 2019 05:30) (99% - 100%)  CAPILLARY BLOOD GLUCOSE        I&O's Summary        PHYSICAL EXAM  GENERAL: NAD, elderly thin man cooperative with exam  HEAD:  Atraumatic, Normocephalic  EYES: EOMI, PERRLA, conjunctiva and sclera clear  NECK: Supple, No JVD  CHEST/LUNG: Clear to auscultation bilaterally; No wheeze  HEART: Regular rate and rhythm; No murmurs, rubs, or gallops  ABDOMEN: Soft, Nontender, Nondistended; Bowel sounds present  EXTREMITIES:  2+ Peripheral Pulses, No clubbing, cyanosis, or edema  PSYCH: Alert, follows commands, moving extremities spontaneously   SKIN: No rashes or lesions    LABS:                        9.2    17.94 )-----------( 502      ( 28 Aug 2019 05:40 )             32.6     08-28    141  |  94<L>  |  24<H>  ----------------------------<  88  3.6   |  29  |  0.97    Ca    9.6      28 Aug 2019 05:40  Mg     1.7         TPro  7.3  /  Alb  3.3  /  TBili  0.3  /  DBili  < 0.2  /  AST  20  /  ALT  15  /  AlkPhos  59      PT/INR - ( 27 Aug 2019 00:20 )   PT: 13.5 SEC;   INR: 1.18          PTT - ( 27 Aug 2019 00:20 )  PTT:32.0 SEC      Urinalysis Basic - ( 27 Aug 2019 05:00 )    Color: LIGHT YELLOW / Appearance: Lt TURBID / S.015 / pH: 7.5  Gluc: NEGATIVE / Ketone: TRACE  / Bili: NEGATIVE / Urobili: NORMAL   Blood: NEGATIVE / Protein: 20 / Nitrite: NEGATIVE   Leuk Esterase: NEGATIVE / RBC: 3-5 / WBC 3-5   Sq Epi: OCC / Non Sq Epi: x / Bacteria: NEGATIVE          RADIOLOGY & ADDITIONAL TESTS:    Imaging Personally Reviewed:  Consultant(s) Notes Reviewed:    Care Discussed with Consultants/Other Providers:

## 2019-08-28 NOTE — SWALLOW BEDSIDE ASSESSMENT ADULT - ASR SWALLOW ASPIRATION MONITOR
change of breathing pattern/cough/upper respiratory infection/pneumonia/fever/throat clearing/gurgly voice

## 2019-08-28 NOTE — DIETITIAN INITIAL EVALUATION ADULT. - SIGNS/SYMPTOMS
Pt with weight change: X 9-10 months, Physical signs described above Weight change: ~42.9% X 10-12 months, Physical signs described above

## 2019-08-28 NOTE — PROGRESS NOTE ADULT - SUBJECTIVE AND OBJECTIVE BOX
INTERVAL HPI/OVERNIGHT EVENTS:  Patient S&E at bedside. INCOMPLETE    MEDICATIONS  (STANDING):  docusate sodium 100 milliGRAM(s) Oral two times a day  enoxaparin Injectable 40 milliGRAM(s) SubCutaneous every 24 hours  gabapentin   Solution 300 milliGRAM(s) Oral two times a day  levothyroxine 25 MICROGram(s) Oral daily  senna 2 Tablet(s) Oral at bedtime    MEDICATIONS  (PRN):  oxyCODONE    IR 20 milliGRAM(s) Oral every 4 hours PRN Pain    Allergies    No Known Drug Allergies  shellfish (Unknown)    Intolerances    VITAL SIGNS:  T(F): 98 (08-28-19 @ 05:30)  HR: 80 (08-28-19 @ 05:30)  BP: 129/85 (08-28-19 @ 05:30)  RR: 19 (08-28-19 @ 05:30)  SpO2: 100% (08-28-19 @ 05:30)  Wt(kg): --    PHYSICAL EXAM: INCOMPLETE  GENERAL: NAD  HEENT: rhinorrhea, EOMI  RESP: CTAB  HEART: RRR, S1/S2  ABDOMEN: +BS, soft, NT, ND  EXTREMITIES: no LE edema  NEUROLOGIC: AAO x 3, dysarthric and difficult to understand speech    LABS:                        9.2    17.94 )-----------( 502      ( 28 Aug 2019 05:40 )             32.6     08-28    141  |  94<L>  |  24<H>  ----------------------------<  88  3.6   |  29  |  0.97    Ca    9.6      28 Aug 2019 05:40  Mg     1.7     08-28    TPro  7.3  /  Alb  3.3  /  TBili  0.3  /  DBili  < 0.2  /  AST  20  /  ALT  15  /  AlkPhos  59  08-28    PT/INR - ( 27 Aug 2019 00:20 )   PT: 13.5 SEC;   INR: 1.18     PTT - ( 27 Aug 2019 00:20 )  PTT:32.0 SEC    RADIOLOGY & ADDITIONAL TESTS:  Studies reviewed. INTERVAL HPI/OVERNIGHT EVENTS:  Patient S&E at bedside. Failed s/s eval, currently NPO    MEDICATIONS  (STANDING):  docusate sodium 100 milliGRAM(s) Oral two times a day  enoxaparin Injectable 40 milliGRAM(s) SubCutaneous every 24 hours  gabapentin   Solution 300 milliGRAM(s) Oral two times a day  levothyroxine 25 MICROGram(s) Oral daily  senna 2 Tablet(s) Oral at bedtime    MEDICATIONS  (PRN):  oxyCODONE    IR 20 milliGRAM(s) Oral every 4 hours PRN Pain    Allergies    No Known Drug Allergies  shellfish (Unknown)    Intolerances    VITAL SIGNS:  T(F): 98 (08-28-19 @ 05:30)  HR: 80 (08-28-19 @ 05:30)  BP: 129/85 (08-28-19 @ 05:30)  RR: 19 (08-28-19 @ 05:30)  SpO2: 100% (08-28-19 @ 05:30)  Wt(kg): --    PHYSICAL EXAM:   GENERAL: NAD  HEENT: EOMI  RESP: CTAB  HEART: RRR, S1/S2  ABDOMEN: +BS, soft, NT, ND  EXTREMITIES: no LE edema  NEUROLOGIC: AAO x 3, dysarthric and difficult to understand speech    LABS:                        9.2    17.94 )-----------( 502      ( 28 Aug 2019 05:40 )             32.6     08-28    141  |  94<L>  |  24<H>  ----------------------------<  88  3.6   |  29  |  0.97    Ca    9.6      28 Aug 2019 05:40  Mg     1.7     08-28    TPro  7.3  /  Alb  3.3  /  TBili  0.3  /  DBili  < 0.2  /  AST  20  /  ALT  15  /  AlkPhos  59  08-28    PT/INR - ( 27 Aug 2019 00:20 )   PT: 13.5 SEC;   INR: 1.18     PTT - ( 27 Aug 2019 00:20 )  PTT:32.0 SEC    RADIOLOGY & ADDITIONAL TESTS:  Studies reviewed.

## 2019-08-28 NOTE — PROGRESS NOTE ADULT - PROBLEM SELECTOR PLAN 1
- Nutrition and PT evaluation  - evaluate for progression of cancer with MRI (see below) - Nutrition and PT evaluation  - evaluate for progression of cancer with MRI (see below)  - failed swallow evaluation, pending cinesophagram

## 2019-08-28 NOTE — SWALLOW BEDSIDE ASSESSMENT ADULT - SWALLOW EVAL: DIAGNOSIS
Patient presented with functional oral stage for puree, thin liquids and honey thickened liquids marked by adequate bolus fomratoin, transfer, and clearance from oral cavity.  The patient presented with severe pharyngeal stage dysphagia for puree, honey thickened liquids and thin liquids marked by prompt swallow trigger, reduced hyolaryngeal elevation upon digital palpation and overt s/s of penetration/aspiration marked by throat clearing and severe wet vocal quality post swallows.

## 2019-08-28 NOTE — DIETITIAN INITIAL EVALUATION ADULT. - DIET TYPE
Initiate PO diet once Clinically indicated; Suggest: PO diet/Fluid consistency per SLp recommendation; Pt NPO Suggest: To initiate PO diet once Clinically indicated; Suggest: PO diet/Fluid consistency per SLP recommendation;

## 2019-08-28 NOTE — PROGRESS NOTE ADULT - ASSESSMENT
55 yo M with locally advanced nasopharyngeal carcinoma (dx 3/2018) s/p cis/RT followed by adjuvant cisplatin with POD (3/2019) recently started pembrolizumab (C2 on 8/23) p/w FTT, AMS, and falls.     1. FTT: likely 2/2 malignancy  - CT head with no ICH  - afebrile, no localizing infectious symptoms  - TSH normal  - PT and nutrition eval    2. Locally advanced nasopharyngeal carcinoma:  - most recently on pembrolizumab, received C2 on 8/23  - recommend MRI head and neck to evaluate disease status  - if progressive disease, will likely need to address goals of care  - follows with Dr. Leon at [facility]    Lennie Bullock, PGY-6  Hematology-Oncology Fellow  Pager: [phone] ([facility]), [phone] ([facility])  (After 5 pm or on weekends please page the on-call fellow) 53 yo M with locally advanced nasopharyngeal carcinoma (dx 3/2018) s/p cis/RT followed by adjuvant cisplatin with POD (3/2019) recently started pembrolizumab (C2 on 8/23) p/w FTT, AMS, and falls.     1. FTT: likely 2/2 malignancy  - CT head with no ICH  - afebrile, no localizing infectious symptoms  - TSH normal  - PT and nutrition eval  - failed s/s eval, pending cinesophagram     2. Locally advanced nasopharyngeal carcinoma:  - most recently on pembrolizumab, received C2 on 8/23  - recommend MRI head and neck to evaluate disease status  - if progressive disease, will likely need to address goals of care  - follows with Dr. Leon at [facility]    Lennie Bullock, PGY-6  Hematology-Oncology Fellow  Pager: [phone] ([facility]), [phone] ([facility])  (After 5 pm or on weekends please page the on-call fellow)

## 2019-08-28 NOTE — DIETITIAN INITIAL EVALUATION ADULT. - OTHER INFO
Pt 53 yo male with nasopharyngeal carcinoma (diagnosed 10/18 s/p biopsy, radiation, cisplatin followed by adjuvant cisplatin and 5FU from 11/18 to 5/19, tumor refractory/resistant to chemo/radiation, now receiving on C2 pembrolizumab started 8/23/19) - per chart review. At time of visit Pt appears alert, oriented; Pt speaks Cantonese; Pt communicates in English as well. Per Pt his appetite has been poor for past ~10-12 months. Pt also stated he lost weight >70# during that time frame. At home, PTA, Pt was having "mushy type food items/liquids" reported. Pt failed Swallow Bedside Assessment (8/28) this morning. Case discussed with SLP ->> SLP recommended for Cinesophagram, Pt to keep NPO until Cinesophagram is done. At time of visit Pt was asking for food; Pt was asking for water as well. No report of nausea, vomiting or diarrhea @ this time. Case discussed with nurse. RDN remains available, Pt made aware.

## 2019-08-28 NOTE — DIETITIAN INITIAL EVALUATION ADULT. - PHYSICAL APPEARANCE
other (specify)/underweight Nutrition focused physical exam conducted - found signs of malnutrition [ ] absent [X] present Subcutaneous fat loss: [severe] Ribs region. Muscle wasting: [severe] Temples region, [severe] Clavicle region [severe] Shoulder region, [severe] Calf region

## 2019-08-29 DIAGNOSIS — B37.0 CANDIDAL STOMATITIS: ICD-10-CM

## 2019-08-29 LAB
ANION GAP SERPL CALC-SCNC: 19 MMO/L — HIGH (ref 7–14)
B PERT DNA SPEC QL NAA+PROBE: NOT DETECTED — SIGNIFICANT CHANGE UP
BASOPHILS # BLD AUTO: 0.03 K/UL — SIGNIFICANT CHANGE UP (ref 0–0.2)
BASOPHILS NFR BLD AUTO: 0.2 % — SIGNIFICANT CHANGE UP (ref 0–2)
BUN SERPL-MCNC: 28 MG/DL — HIGH (ref 7–23)
C PNEUM DNA SPEC QL NAA+PROBE: NOT DETECTED — SIGNIFICANT CHANGE UP
CALCIUM SERPL-MCNC: 9.7 MG/DL — SIGNIFICANT CHANGE UP (ref 8.4–10.5)
CHLORIDE SERPL-SCNC: 96 MMOL/L — LOW (ref 98–107)
CO2 SERPL-SCNC: 27 MMOL/L — SIGNIFICANT CHANGE UP (ref 22–31)
CREAT SERPL-MCNC: 0.9 MG/DL — SIGNIFICANT CHANGE UP (ref 0.5–1.3)
EOSINOPHIL # BLD AUTO: 0.01 K/UL — SIGNIFICANT CHANGE UP (ref 0–0.5)
EOSINOPHIL NFR BLD AUTO: 0.1 % — SIGNIFICANT CHANGE UP (ref 0–6)
FLUAV H1 2009 PAND RNA SPEC QL NAA+PROBE: NOT DETECTED — SIGNIFICANT CHANGE UP
FLUAV H1 RNA SPEC QL NAA+PROBE: NOT DETECTED — SIGNIFICANT CHANGE UP
FLUAV H3 RNA SPEC QL NAA+PROBE: NOT DETECTED — SIGNIFICANT CHANGE UP
FLUAV SUBTYP SPEC NAA+PROBE: NOT DETECTED — SIGNIFICANT CHANGE UP
FLUBV RNA SPEC QL NAA+PROBE: NOT DETECTED — SIGNIFICANT CHANGE UP
GLUCOSE SERPL-MCNC: 87 MG/DL — SIGNIFICANT CHANGE UP (ref 70–99)
HADV DNA SPEC QL NAA+PROBE: NOT DETECTED — SIGNIFICANT CHANGE UP
HCOV PNL SPEC NAA+PROBE: SIGNIFICANT CHANGE UP
HCT VFR BLD CALC: 30.8 % — LOW (ref 39–50)
HGB BLD-MCNC: 9 G/DL — LOW (ref 13–17)
HMPV RNA SPEC QL NAA+PROBE: NOT DETECTED — SIGNIFICANT CHANGE UP
HPIV1 RNA SPEC QL NAA+PROBE: NOT DETECTED — SIGNIFICANT CHANGE UP
HPIV2 RNA SPEC QL NAA+PROBE: NOT DETECTED — SIGNIFICANT CHANGE UP
HPIV3 RNA SPEC QL NAA+PROBE: NOT DETECTED — SIGNIFICANT CHANGE UP
HPIV4 RNA SPEC QL NAA+PROBE: NOT DETECTED — SIGNIFICANT CHANGE UP
IMM GRANULOCYTES NFR BLD AUTO: 0.8 % — SIGNIFICANT CHANGE UP (ref 0–1.5)
LYMPHOCYTES # BLD AUTO: 0.45 K/UL — LOW (ref 1–3.3)
LYMPHOCYTES # BLD AUTO: 2.7 % — LOW (ref 13–44)
MCHC RBC-ENTMCNC: 19.3 PG — LOW (ref 27–34)
MCHC RBC-ENTMCNC: 29.2 % — LOW (ref 32–36)
MCV RBC AUTO: 66.1 FL — LOW (ref 80–100)
MONOCYTES # BLD AUTO: 0.87 K/UL — SIGNIFICANT CHANGE UP (ref 0–0.9)
MONOCYTES NFR BLD AUTO: 5.1 % — SIGNIFICANT CHANGE UP (ref 2–14)
NEUTROPHILS # BLD AUTO: 15.46 K/UL — HIGH (ref 1.8–7.4)
NEUTROPHILS NFR BLD AUTO: 91.1 % — HIGH (ref 43–77)
NRBC # FLD: 0.03 K/UL — SIGNIFICANT CHANGE UP (ref 0–0)
PLATELET # BLD AUTO: 444 K/UL — HIGH (ref 150–400)
PMV BLD: 9.9 FL — SIGNIFICANT CHANGE UP (ref 7–13)
POTASSIUM SERPL-MCNC: 3.2 MMOL/L — LOW (ref 3.5–5.3)
POTASSIUM SERPL-SCNC: 3.2 MMOL/L — LOW (ref 3.5–5.3)
RBC # BLD: 4.66 M/UL — SIGNIFICANT CHANGE UP (ref 4.2–5.8)
RBC # FLD: 15.8 % — HIGH (ref 10.3–14.5)
RSV RNA SPEC QL NAA+PROBE: NOT DETECTED — SIGNIFICANT CHANGE UP
RV+EV RNA SPEC QL NAA+PROBE: NOT DETECTED — SIGNIFICANT CHANGE UP
SODIUM SERPL-SCNC: 142 MMOL/L — SIGNIFICANT CHANGE UP (ref 135–145)
WBC # BLD: 16.95 K/UL — HIGH (ref 3.8–10.5)
WBC # FLD AUTO: 16.95 K/UL — HIGH (ref 3.8–10.5)

## 2019-08-29 PROCEDURE — 99233 SBSQ HOSP IP/OBS HIGH 50: CPT

## 2019-08-29 PROCEDURE — 74230 X-RAY XM SWLNG FUNCJ C+: CPT | Mod: 26

## 2019-08-29 PROCEDURE — 99232 SBSQ HOSP IP/OBS MODERATE 35: CPT

## 2019-08-29 RX ORDER — FLUCONAZOLE 150 MG/1
200 TABLET ORAL EVERY 24 HOURS
Refills: 0 | Status: COMPLETED | OUTPATIENT
Start: 2019-08-29 | End: 2019-09-04

## 2019-08-29 RX ORDER — OXYCODONE HYDROCHLORIDE 5 MG/1
20 TABLET ORAL EVERY 4 HOURS
Refills: 0 | Status: DISCONTINUED | OUTPATIENT
Start: 2019-08-29 | End: 2019-09-03

## 2019-08-29 RX ORDER — FENTANYL CITRATE 50 UG/ML
1 INJECTION INTRAVENOUS
Refills: 0 | Status: DISCONTINUED | OUTPATIENT
Start: 2019-08-29 | End: 2019-08-30

## 2019-08-29 RX ORDER — POTASSIUM CHLORIDE 20 MEQ
10 PACKET (EA) ORAL
Refills: 0 | Status: COMPLETED | OUTPATIENT
Start: 2019-08-29 | End: 2019-08-29

## 2019-08-29 RX ADMIN — OXYCODONE HYDROCHLORIDE 20 MILLIGRAM(S): 5 TABLET ORAL at 22:08

## 2019-08-29 RX ADMIN — OXYCODONE HYDROCHLORIDE 20 MILLIGRAM(S): 5 TABLET ORAL at 17:45

## 2019-08-29 RX ADMIN — OXYCODONE HYDROCHLORIDE 20 MILLIGRAM(S): 5 TABLET ORAL at 17:17

## 2019-08-29 RX ADMIN — Medication 100 MILLIEQUIVALENT(S): at 11:40

## 2019-08-29 RX ADMIN — Medication 100 MILLIEQUIVALENT(S): at 12:43

## 2019-08-29 RX ADMIN — SENNA PLUS 2 TABLET(S): 8.6 TABLET ORAL at 22:08

## 2019-08-29 RX ADMIN — FLUCONAZOLE 100 MILLIGRAM(S): 150 TABLET ORAL at 15:51

## 2019-08-29 RX ADMIN — ENOXAPARIN SODIUM 40 MILLIGRAM(S): 100 INJECTION SUBCUTANEOUS at 05:03

## 2019-08-29 RX ADMIN — Medication 100 MILLIEQUIVALENT(S): at 10:35

## 2019-08-29 RX ADMIN — FENTANYL CITRATE 1 PATCH: 50 INJECTION INTRAVENOUS at 11:42

## 2019-08-29 RX ADMIN — FENTANYL CITRATE 1 PATCH: 50 INJECTION INTRAVENOUS at 19:20

## 2019-08-29 RX ADMIN — GABAPENTIN 300 MILLIGRAM(S): 400 CAPSULE ORAL at 19:10

## 2019-08-29 RX ADMIN — Medication 100 MILLIGRAM(S): at 19:10

## 2019-08-29 RX ADMIN — OXYCODONE HYDROCHLORIDE 20 MILLIGRAM(S): 5 TABLET ORAL at 23:00

## 2019-08-29 NOTE — PROGRESS NOTE ADULT - SUBJECTIVE AND OBJECTIVE BOX
INTERVAL HPI/OVERNIGHT EVENTS:  Patient S&E at bedside. Went for MBS and started pureed diet.     MEDICATIONS  (STANDING):  docusate sodium 100 milliGRAM(s) Oral two times a day  enoxaparin Injectable 40 milliGRAM(s) SubCutaneous every 24 hours  gabapentin   Solution 300 milliGRAM(s) Oral two times a day  levothyroxine 25 MICROGram(s) Oral daily  senna 2 Tablet(s) Oral at bedtime    MEDICATIONS  (PRN):  oxyCODONE    IR 20 milliGRAM(s) Oral every 4 hours PRN Pain    Allergies    No Known Drug Allergies  shellfish (Unknown)    Intolerances    VITAL SIGNS:  T(F): 98 (08-28-19 @ 05:30)  HR: 80 (08-28-19 @ 05:30)  BP: 129/85 (08-28-19 @ 05:30)  RR: 19 (08-28-19 @ 05:30)  SpO2: 100% (08-28-19 @ 05:30)  Wt(kg): --    PHYSICAL EXAM:   GENERAL: NAD  HEENT: EOMI  RESP: CTAB  HEART: RRR, S1/S2  ABDOMEN: +BS, soft, NT, ND  EXTREMITIES: no LE edema  NEUROLOGIC: AAO x 3, dysarthric and difficult to understand speech    LABS:                        9.2    17.94 )-----------( 502      ( 28 Aug 2019 05:40 )             32.6     08-28    141  |  94<L>  |  24<H>  ----------------------------<  88  3.6   |  29  |  0.97    Ca    9.6      28 Aug 2019 05:40  Mg     1.7     08-28    TPro  7.3  /  Alb  3.3  /  TBili  0.3  /  DBili  < 0.2  /  AST  20  /  ALT  15  /  AlkPhos  59  08-28    PT/INR - ( 27 Aug 2019 00:20 )   PT: 13.5 SEC;   INR: 1.18     PTT - ( 27 Aug 2019 00:20 )  PTT:32.0 SEC    RADIOLOGY & ADDITIONAL TESTS:  Studies reviewed.

## 2019-08-29 NOTE — SWALLOW VFSS/MBS ASSESSMENT ADULT - DIAGNOSTIC IMPRESSIONS
Patient presents with Mild Oral Stage and Moderate Pharyngeal Stage Dysphagia. The Oral Stage is characterized by adequate oral containment, slow chewing for solid consistency, slow bolus manipulation, slow tongue motion with slow anterior to posterior transfer of the bolus for puree/solids; piecemeal transfer and deglutition with trace oral clearance deficits located on the tongue/palate surface post swallow.   The Pharyngeal Stage is characterized by delayed initiation of the pharyngeal swallow (Bolus head is at the vallecular for Thin Liquids), reduced laryngeal elevation with reduced laryngeal vestibular closure, reduced tongue base retraction with reduced epiglottic deflection resulting in mild vallecular residue post primary swallow for puree/solids and reduced pharyngeal constriction resulting in trace pyriform/pharyngeal wall residue post primary swallow for puree/solids. There is trace/mild pharyngeal clearance deficits located in the vallecular/pyriforms/pharyngeal wall post primary swallow for puree/solids.  There is Laryngeal Penetration (Silent) during the swallow for Thin Liquids and Nectar Thick Liquids without retrieval migrating down to the level of the anterior vocal folds. Patient is not adequately sensate to the Laryngeal Penetration given no reflexive cough response.  Patient is verbally cued but Patient is unable to produce a volitional cough to expel contrast from the upper airway.  There was No Aspiration observed before, during or after the swallow for puree/honey thick liquids.      Of Note: There appears to be narrowing of the montez/hypopharynx. Patient presents with Mild Oral Stage and Moderate Pharyngeal Stage Dysphagia. The Oral Stage is characterized by adequate oral containment, slow chewing for solid consistency, slow bolus manipulation, slow tongue motion with slow anterior to posterior transfer of the bolus for puree/solids; piecemeal transfer and deglutition with trace oral clearance deficits located on the tongue/palate surface post swallow.   The Pharyngeal Stage is characterized by delayed initiation of the pharyngeal swallow (Bolus head is at the vallecular for Thin Liquids), reduced laryngeal elevation with reduced laryngeal vestibular closure, reduced tongue base retraction with reduced epiglottic deflection resulting in mild vallecular residue post primary swallow for puree/solids and reduced pharyngeal constriction resulting in trace pyriform/pharyngeal wall residue post primary swallow for puree/solids. There is trace/mild pharyngeal clearance deficits located in the vallecular/pyriforms/pharyngeal wall post primary swallow for puree/solids.  There is Laryngeal Penetration (Silent) during the swallow for Thin Liquids and Nectar Thick Liquids without retrieval migrating down to the level of the anterior vocal folds. Patient is not adequately sensate to the Laryngeal Penetration given no reflexive cough response.  Patient is verbally cued but Patient is unable to produce a volitional cough to expel contrast from the upper airway.  There was No Aspiration observed before, during or after the swallow for puree/honey thick liquids.      Of Note: There appears to be narrowing of the montez/hypopharynx exacerbated by nasopharyngeal mass. Patient presents with Mild Oral Stage and Moderate Pharyngeal Stage Dysphagia. The Oral Stage is characterized by adequate oral containment, slow chewing for solid consistency, slow bolus manipulation, slow tongue motion with slow anterior to posterior transfer of the bolus for puree/solids; piecemeal transfer and deglutition with trace oral clearance deficits located on the tongue/palate surface post swallow.   The Pharyngeal Stage is characterized by delayed initiation of the pharyngeal swallow (Bolus head is at the vallecular for Thin Liquids), reduced laryngeal elevation with reduced laryngeal vestibular closure, reduced tongue base retraction with reduced epiglottic deflection resulting in mild vallecular residue post primary swallow for puree/solids and reduced pharyngeal constriction resulting in trace pyriform/pharyngeal wall residue post primary swallow for puree/solids. There is trace/mild pharyngeal clearance deficits located in the vallecular/pyriforms/pharyngeal wall post primary swallow for puree/solids.  There is Laryngeal Penetration (Silent) during the swallow for Thin Liquids and Nectar Thick Liquids without retrieval migrating down to the level of the anterior vocal folds. Patient is not adequately sensate to the Laryngeal Penetration given no reflexive cough response.  Patient is verbally cued but Patient is unable to produce a volitional cough to expel contrast from the upper airway.  There was No Aspiration observed before, during or after the swallow for puree/honey thick liquids.      Of Note: There appears to be narrowing of the montez/hypopharynx mildly obstructing bolus passage likely exacerbated by current medical management for nasopharyngeal mass.

## 2019-08-29 NOTE — SWALLOW VFSS/MBS ASSESSMENT ADULT - RECOMMENDED CONSISTENCY
1.) Puree with Honey Thick Liquids  2.) Feeding/Swallowing Guidelines: Sit upright, teaspoon amount at a time, small single cup sip of honey thick liquids; alternate puree and honey thick liquids  3.) Aspiration and Reflux Precautions  4.) Maintain Good Oral Hygiene

## 2019-08-29 NOTE — PROGRESS NOTE ADULT - SUBJECTIVE AND OBJECTIVE BOX
Patient is a 54y old  Male who presents with a chief complaint of falls x 3-4 days (28 Aug 2019 10:01)    SUBJECTIVE / OVERNIGHT EVENTS:  Patient seen denying any complaints.     MEDICATIONS  (STANDING):  docusate sodium 100 milliGRAM(s) Oral two times a day  enoxaparin Injectable 40 milliGRAM(s) SubCutaneous every 24 hours  fentaNYL   Patch  25 MICROgram(s)/Hr 1 Patch Transdermal every 72 hours  fluconAZOLE IVPB 200 milliGRAM(s) IV Intermittent every 24 hours  gabapentin   Solution 300 milliGRAM(s) Oral two times a day  levothyroxine 25 MICROGram(s) Oral daily  potassium chloride  10 mEq/100 mL IVPB 10 milliEquivalent(s) IV Intermittent every 1 hour  senna 2 Tablet(s) Oral at bedtime    MEDICATIONS  (PRN):  oxyCODONE    IR 20 milliGRAM(s) Oral every 4 hours PRN Pain      Vital Signs Last 24 Hrs  T(C): 36.6 (29 Aug 2019 11:00), Max: 37 (29 Aug 2019 05:00)  T(F): 97.8 (29 Aug 2019 11:00), Max: 98.6 (29 Aug 2019 05:00)  HR: 90 (29 Aug 2019 11:00) (80 - 90)  BP: 114/82 (29 Aug 2019 11:00) (105/72 - 138/87)  BP(mean): --  RR: 17 (29 Aug 2019 11:00) (17 - 18)  SpO2: 100% (29 Aug 2019 11:00) (100% - 100%)  CAPILLARY BLOOD GLUCOSE      POCT Blood Glucose.: 90 mg/dL (29 Aug 2019 06:24)  POCT Blood Glucose.: 88 mg/dL (28 Aug 2019 22:11)    I&O's Summary        PHYSICAL EXAM  GENERAL: NAD, elderly thin man cooperative with exam  HEAD:  Atraumatic, Normocephalic  EYES: EOMI, PERRLA, conjunctiva and sclera clear  PHARYNX: Diffuse thrush  NECK: Supple, No JVD  CHEST/LUNG: Clear to auscultation bilaterally; No wheeze  HEART: Regular rate and rhythm; No murmurs, rubs, or gallops  ABDOMEN: Soft, Nontender, Nondistended; Bowel sounds present  EXTREMITIES:  2+ Peripheral Pulses, No clubbing, cyanosis, or edema  PSYCH: Alert, follows commands, moving extremities spontaneously   SKIN: No rashes or lesions      LABS:                        9.0    16.95 )-----------( 444      ( 29 Aug 2019 06:30 )             30.8     08-29    142  |  96<L>  |  28<H>  ----------------------------<  87  3.2<L>   |  27  |  0.90    Ca    9.7      29 Aug 2019 06:30  Mg     1.7     08-28    TPro  7.3  /  Alb  3.3  /  TBili  0.3  /  DBili  < 0.2  /  AST  20  /  ALT  15  /  AlkPhos  59  08-28                RADIOLOGY & ADDITIONAL TESTS:    Imaging Personally Reviewed:  Consultant(s) Notes Reviewed:    Care Discussed with Consultants/Other Providers:

## 2019-08-29 NOTE — SWALLOW VFSS/MBS ASSESSMENT ADULT - COMMENTS
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    Hospitalist Note on 8/28/2019 - 54 year old male with nasopharnygeal carcinoma (diagnosed 10/18 s/p biopsy, radiation, cisplatin followed by adjuvant cisplatin and 5FU from 11/18 to 5/19, tumor refractory/resistant to chemo/radiation, now receiving on C2 pembrolizumab started 8/23/19), presents with 3-4 days of increased falls, generalized weakness admitted for further workup and evaluation.  Problem: Nasopharyngeal cancer.  Plan: symptoms began 3/18, biopsy 10/18 revealing non-keratinizing, differentiated nasopharyngeal carcinoma s/p radiation with cisplatin followed by adjuvant cisplatin and 5FU from 11/18 to 5/19, per oncology, tumor refractory/resistant to chemo/radiation, now receiving pembrolizumab, cycle 2 on 8/23/19. 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    Hospitalist Note on 8/28/2019 - 54 year old male with nasopharnygeal carcinoma (diagnosed 10/18 s/p biopsy, radiation, cisplatin followed by adjuvant cisplatin and 5FU from 11/18 to 5/19, tumor refractory/resistant to chemo/radiation, now receiving on C2 pembrolizumab started 8/23/19), presents with 3-4 days of increased falls, generalized weakness admitted for further workup and evaluation.  Problem: Nasopharyngeal cancer.  Plan: symptoms began 3/18, biopsy 10/18 revealing non-keratinizing, differentiated nasopharyngeal carcinoma s/p radiation with cisplatin followed by adjuvant cisplatin and 5FU from 11/18 to 5/19, per oncology, tumor refractory/resistant to chemo/radiation, now receiving pembrolizumab, cycle 2 on 8/23/19.    Patient seen for a Clinical Swallow Eval on 8/28/2019 (See Consult).

## 2019-08-29 NOTE — SWALLOW VFSS/MBS ASSESSMENT ADULT - PHARYNGEAL PHASE COMMENTS
adequate initiation of the pharyngeal swallow, reduced laryngeal elevation, reduced tongue base retraction, reduced epiglottic deflection, reduced pharyngeal constriction adequate initiation of the pharyngeal swallow, reduced laryngeal elevation, reduced tongue base retraction, reduced epiglottic deflection, reduced pharyngeal constriction. delayed initiation of the pharyngeal swallow, reduced laryngeal elevation, reduced tongue base retraction, reduced epiglottic deflection, reduced pharyngeal constriction

## 2019-08-29 NOTE — PROGRESS NOTE ADULT - PROBLEM SELECTOR PLAN 1
- Nutrition and PT evaluation  - evaluate for progression of cancer with MRI (see below)  - F/U am cortisol   - s/p cinesophagram recommending puree with Honey Thick Liquids, resume aspiration precautions

## 2019-08-29 NOTE — PROGRESS NOTE ADULT - ASSESSMENT
55 yo M with locally advanced nasopharyngeal carcinoma (dx 3/2018) s/p cis/RT followed by adjuvant cisplatin with POD (3/2019) recently started pembrolizumab (C2 on 8/23) p/w FTT, AMS, and falls.     1. FTT: likely 2/2 malignancy  - CT head with no ICH  - afebrile, no localizing infectious symptoms  - TSH normal  - on pureed diet  - PT, nutrition    2. Locally advanced nasopharyngeal carcinoma:  - most recently on pembrolizumab, received C2 on 8/23  - MRI results reviewed with extensive and progressive disease  - recommend palliative care for goals of care and plan for family meeting tomorrow    d/w primary team    Lennie Bullock, PGY-6  Hematology-Oncology Fellow  Pager: [phone] ([facility]), [phone] ([facility])  (After 5 pm or on weekends please page the on-call fellow)

## 2019-08-30 ENCOUNTER — OUTPATIENT (OUTPATIENT)
Dept: OUTPATIENT SERVICES | Facility: HOSPITAL | Age: 54
LOS: 1 days | Discharge: HOME | End: 2019-08-30

## 2019-08-30 DIAGNOSIS — Z51.5 ENCOUNTER FOR PALLIATIVE CARE: ICD-10-CM

## 2019-08-30 DIAGNOSIS — G89.3 NEOPLASM RELATED PAIN (ACUTE) (CHRONIC): ICD-10-CM

## 2019-08-30 DIAGNOSIS — C11.9 MALIGNANT NEOPLASM OF NASOPHARYNX, UNSPECIFIED: ICD-10-CM

## 2019-08-30 DIAGNOSIS — Z71.89 OTHER SPECIFIED COUNSELING: ICD-10-CM

## 2019-08-30 DIAGNOSIS — Z98.49 CATARACT EXTRACTION STATUS, UNSPECIFIED EYE: Chronic | ICD-10-CM

## 2019-08-30 LAB
ANION GAP SERPL CALC-SCNC: 14 MMO/L — SIGNIFICANT CHANGE UP (ref 7–14)
ANISOCYTOSIS BLD QL: SLIGHT — SIGNIFICANT CHANGE UP
BASOPHILS # BLD AUTO: 0.05 K/UL — SIGNIFICANT CHANGE UP (ref 0–0.2)
BASOPHILS NFR BLD AUTO: 0.3 % — SIGNIFICANT CHANGE UP (ref 0–2)
BASOPHILS NFR SPEC: 0 % — SIGNIFICANT CHANGE UP (ref 0–2)
BLASTS # FLD: 0 % — SIGNIFICANT CHANGE UP (ref 0–0)
BUN SERPL-MCNC: 21 MG/DL — SIGNIFICANT CHANGE UP (ref 7–23)
CALCIUM SERPL-MCNC: 9.1 MG/DL — SIGNIFICANT CHANGE UP (ref 8.4–10.5)
CHLORIDE SERPL-SCNC: 96 MMOL/L — LOW (ref 98–107)
CO2 SERPL-SCNC: 30 MMOL/L — SIGNIFICANT CHANGE UP (ref 22–31)
CORTIS SERPL-MCNC: 35.7 UG/DL — HIGH (ref 2.7–18.4)
CREAT SERPL-MCNC: 0.94 MG/DL — SIGNIFICANT CHANGE UP (ref 0.5–1.3)
DACRYOCYTES BLD QL SMEAR: SLIGHT — SIGNIFICANT CHANGE UP
ELLIPTOCYTES BLD QL SMEAR: SLIGHT — SIGNIFICANT CHANGE UP
EOSINOPHIL # BLD AUTO: 0.12 K/UL — SIGNIFICANT CHANGE UP (ref 0–0.5)
EOSINOPHIL NFR BLD AUTO: 0.6 % — SIGNIFICANT CHANGE UP (ref 0–6)
EOSINOPHIL NFR FLD: 0 % — SIGNIFICANT CHANGE UP (ref 0–6)
GLUCOSE SERPL-MCNC: 132 MG/DL — HIGH (ref 70–99)
HCT VFR BLD CALC: 32.2 % — LOW (ref 39–50)
HGB BLD-MCNC: 9.2 G/DL — LOW (ref 13–17)
HIV 1+2 AB+HIV1 P24 AG SERPL QL IA: SIGNIFICANT CHANGE UP
HYPOCHROMIA BLD QL: SLIGHT — SIGNIFICANT CHANGE UP
IMM GRANULOCYTES NFR BLD AUTO: 0.7 % — SIGNIFICANT CHANGE UP (ref 0–1.5)
LYMPHOCYTES # BLD AUTO: 0.53 K/UL — LOW (ref 1–3.3)
LYMPHOCYTES # BLD AUTO: 2.7 % — LOW (ref 13–44)
LYMPHOCYTES NFR SPEC AUTO: 0.9 % — LOW (ref 13–44)
MCHC RBC-ENTMCNC: 19.2 PG — LOW (ref 27–34)
MCHC RBC-ENTMCNC: 28.6 % — LOW (ref 32–36)
MCV RBC AUTO: 67.4 FL — LOW (ref 80–100)
METAMYELOCYTES # FLD: 0 % — SIGNIFICANT CHANGE UP (ref 0–1)
MICROCYTES BLD QL: SLIGHT — SIGNIFICANT CHANGE UP
MONOCYTES # BLD AUTO: 1.21 K/UL — HIGH (ref 0–0.9)
MONOCYTES NFR BLD AUTO: 6.2 % — SIGNIFICANT CHANGE UP (ref 2–14)
MONOCYTES NFR BLD: 7.9 % — SIGNIFICANT CHANGE UP (ref 2–9)
MYELOCYTES NFR BLD: 0 % — SIGNIFICANT CHANGE UP (ref 0–0)
NEUTROPHIL AB SER-ACNC: 89.4 % — HIGH (ref 43–77)
NEUTROPHILS # BLD AUTO: 17.56 K/UL — HIGH (ref 1.8–7.4)
NEUTROPHILS NFR BLD AUTO: 89.5 % — HIGH (ref 43–77)
NEUTS BAND # BLD: 0.9 % — SIGNIFICANT CHANGE UP (ref 0–6)
NRBC # FLD: 0 K/UL — SIGNIFICANT CHANGE UP (ref 0–0)
OTHER - HEMATOLOGY %: 0 — SIGNIFICANT CHANGE UP
PLATELET # BLD AUTO: 357 K/UL — SIGNIFICANT CHANGE UP (ref 150–400)
PLATELET COUNT - ESTIMATE: NORMAL — SIGNIFICANT CHANGE UP
PMV BLD: 10 FL — SIGNIFICANT CHANGE UP (ref 7–13)
POIKILOCYTOSIS BLD QL AUTO: SLIGHT — SIGNIFICANT CHANGE UP
POLYCHROMASIA BLD QL SMEAR: SLIGHT — SIGNIFICANT CHANGE UP
POTASSIUM SERPL-MCNC: 3.2 MMOL/L — LOW (ref 3.5–5.3)
POTASSIUM SERPL-SCNC: 3.2 MMOL/L — LOW (ref 3.5–5.3)
PROMYELOCYTES # FLD: 0 % — SIGNIFICANT CHANGE UP (ref 0–0)
RBC # BLD: 4.78 M/UL — SIGNIFICANT CHANGE UP (ref 4.2–5.8)
RBC # FLD: 15.5 % — HIGH (ref 10.3–14.5)
SODIUM SERPL-SCNC: 140 MMOL/L — SIGNIFICANT CHANGE UP (ref 135–145)
VARIANT LYMPHS # BLD: 0.9 % — SIGNIFICANT CHANGE UP
WBC # BLD: 19.61 K/UL — HIGH (ref 3.8–10.5)
WBC # FLD AUTO: 19.61 K/UL — HIGH (ref 3.8–10.5)

## 2019-08-30 PROCEDURE — 99233 SBSQ HOSP IP/OBS HIGH 50: CPT

## 2019-08-30 PROCEDURE — 99223 1ST HOSP IP/OBS HIGH 75: CPT

## 2019-08-30 PROCEDURE — 99497 ADVNCD CARE PLAN 30 MIN: CPT | Mod: 25

## 2019-08-30 RX ORDER — ACETAMINOPHEN 500 MG
650 TABLET ORAL ONCE
Refills: 0 | Status: COMPLETED | OUTPATIENT
Start: 2019-08-30 | End: 2019-08-30

## 2019-08-30 RX ORDER — POTASSIUM CHLORIDE 20 MEQ
10 PACKET (EA) ORAL
Refills: 0 | Status: COMPLETED | OUTPATIENT
Start: 2019-08-30 | End: 2019-08-30

## 2019-08-30 RX ORDER — DOCUSATE SODIUM 100 MG
100 CAPSULE ORAL
Refills: 0 | Status: DISCONTINUED | OUTPATIENT
Start: 2019-08-30 | End: 2019-09-07

## 2019-08-30 RX ORDER — METHADONE HYDROCHLORIDE 40 MG/1
2.5 TABLET ORAL
Refills: 0 | Status: DISCONTINUED | OUTPATIENT
Start: 2019-08-30 | End: 2019-09-05

## 2019-08-30 RX ADMIN — GABAPENTIN 300 MILLIGRAM(S): 400 CAPSULE ORAL at 18:17

## 2019-08-30 RX ADMIN — FENTANYL CITRATE 1 PATCH: 50 INJECTION INTRAVENOUS at 18:47

## 2019-08-30 RX ADMIN — Medication 100 MILLIEQUIVALENT(S): at 12:14

## 2019-08-30 RX ADMIN — Medication 25 MICROGRAM(S): at 06:26

## 2019-08-30 RX ADMIN — Medication 650 MILLIGRAM(S): at 11:23

## 2019-08-30 RX ADMIN — Medication 100 MILLIEQUIVALENT(S): at 13:22

## 2019-08-30 RX ADMIN — METHADONE HYDROCHLORIDE 2.5 MILLIGRAM(S): 40 TABLET ORAL at 17:53

## 2019-08-30 RX ADMIN — FENTANYL CITRATE 1 PATCH: 50 INJECTION INTRAVENOUS at 06:31

## 2019-08-30 RX ADMIN — GABAPENTIN 300 MILLIGRAM(S): 400 CAPSULE ORAL at 07:10

## 2019-08-30 RX ADMIN — ENOXAPARIN SODIUM 40 MILLIGRAM(S): 100 INJECTION SUBCUTANEOUS at 06:26

## 2019-08-30 RX ADMIN — Medication 650 MILLIGRAM(S): at 10:29

## 2019-08-30 RX ADMIN — FLUCONAZOLE 100 MILLIGRAM(S): 150 TABLET ORAL at 14:37

## 2019-08-30 RX ADMIN — Medication 100 MILLIEQUIVALENT(S): at 10:30

## 2019-08-30 RX ADMIN — Medication 100 MILLIGRAM(S): at 19:10

## 2019-08-30 NOTE — CONSULT NOTE ADULT - PROBLEM SELECTOR RECOMMENDATION 3
HCP is patient's wife (Samra Smith). Patient and family asked for time to think about hospice care and advanced directives. Patient stated that he would like to return home once pain is controlled. Biopsy from 10/18 revealed non-keratinizing, differentiated nasopharyngeal carcinoma s/p radiation with cisplatin followed by adjuvant cisplatin and 5FU from 11/18 to 5/19 at [facility], now established care with oncologist Dr. Leon, per oncology, tumor refractory/resistant to chemo/radiation, s/p pembrolizumab (cycle 2 on 8/23/19). As per oncology, both treatments have failed to suppress aggressive tumor growth. No DMT can be offered.

## 2019-08-30 NOTE — CONSULT NOTE ADULT - PROBLEM SELECTOR RECOMMENDATION 2
Biopsy from 10/18 revealed non-keratinizing, differentiated nasopharyngeal carcinoma s/p radiation with cisplatin followed by adjuvant cisplatin and 5FU from 11/18 to 5/19 at [facility], now established care with oncologist Dr. Leon, per oncology, tumor refractory/resistant to chemo/radiation, s/p pembrolizumab (cycle 2 on 8/23/19). As per oncology, both treatments have failed to suppress aggressive tumor growth. No DMT can be offered. HCP is patient's wife (Samra Smith). Patient and family asked for time to think about hospice care and advanced directives. Patient stated that he would like to return home once pain is controlled.

## 2019-08-30 NOTE — CONSULT NOTE ADULT - SUBJECTIVE AND OBJECTIVE BOX
HPI:  55 y/o M w/ nasopharnygeal carcinoma (symptoms began 3/18, biopsy 10/18 revealing non-keratinizing, differentiated nasopharyngeal carcinoma s/p radiation with cisplatin followed by adjuvant cisplatin and 5FU from 11/18 to 5/19 at [facility], now established care with oncologist Dr. Leon, per oncology, tumor refractory/resistant to chemo/radiation, now receiving pembrolizumab, cycle 2 on 8/23/19), presents with 3-4 days of increased falls, patient denies head trauma, generalized weakness, and decreased PO intake. Last immunotherapy reported to be on 8/23/19. Patient currently reports headache, nonradiating, without any nausea, vomiting. No other recent change in symptoms per patient, denies fevers, chills, recent illnesses, difficulty breathing, cough, or chest pain. (27 Aug 2019 16:29)    PERTINENT PM/SXH:   Nasopharyngeal cancer  Smoker  Hypercholesterolemia    Status post cataract extraction    FAMILY HISTORY:  No pertinent family history in first degree relatives    ITEMS NOT CHECKED ARE NOT PRESENT    SOCIAL HISTORY:   Significant other/partner:  [ ]  Children:  [ ]  Scientology/Spirituality:  Substance hx:  [x]   Tobacco hx:  [ ]   Alcohol hx: [ ]   Home Opioid hx:  [ ] I-Stop Reference No:  Living Situation: [x]Home  [ ]Long term care  [ ]Rehab [ ]Other    ADVANCE DIRECTIVES:    DNR  MOLST  [ ]  Living Will  [ ]   DECISION MAKER(s):  [ ] Health Care Proxy(s)  [ ] Surrogate(s)  [ ] Guardian           Name(s): Phone Number(s):    BASELINE (I)ADL(s) (prior to admission):  [place]: [x]Total  [ ] Moderate [ ]Dependent    Allergies    No Known Drug Allergies  shellfish (Unknown)    Intolerances    MEDICATIONS  (STANDING):  docusate sodium 100 milliGRAM(s) Oral two times a day  enoxaparin Injectable 40 milliGRAM(s) SubCutaneous every 24 hours  fentaNYL   Patch  25 MICROgram(s)/Hr 1 Patch Transdermal every 72 hours  fluconAZOLE IVPB 200 milliGRAM(s) IV Intermittent every 24 hours  gabapentin   Solution 300 milliGRAM(s) Oral two times a day  levothyroxine 25 MICROGram(s) Oral daily  potassium chloride  10 mEq/100 mL IVPB 10 milliEquivalent(s) IV Intermittent every 1 hour  senna 2 Tablet(s) Oral at bedtime    MEDICATIONS  (PRN):  oxyCODONE    IR 20 milliGRAM(s) Oral every 4 hours PRN Pain    PRESENT SYMPTOMS: [ ]Unable to obtain due to poor mentation   Source if other than patient:  [ ]Family   [ ]Team     Pain: [ ] yes [x] no  QOL impact -   Location -                    Aggravating factors -  Quality -  Radiation -  Timing-  Severity (0-10 scale):  Minimal acceptable level (0-10 scale):     PAIN AD Score:     [email or web address] (press ctrl +  left click to view)    Dyspnea:                           [ ]Mild [ ]Moderate [ ]Severe  Anxiety:                             [ ]Mild [ ]Moderate [ ]Severe  Fatigue:                             [ ]Mild [ ]Moderate [ ]Severe  Nausea:                             [ ]Mild [ ]Moderate [ ]Severe  Loss of appetite:              [ ]Mild [ ]Moderate [ ]Severe  Constipation:                    [ ]Mild [ ]Moderate [ ]Severe    Other Symptoms:  [ ]All other review of systems negative     Karnofsky Performance Score/Palliative Performance Status Version 2:     60-70% http://npcrc.org/files/news/palliative_performance_scale_ppsv2.pdf  PHYSICAL EXAM:  Vital Signs Last 24 Hrs  T(C): 36.6 (30 Aug 2019 03:30), Max: 36.7 (29 Aug 2019 17:00)  T(F): 97.8 (30 Aug 2019 03:30), Max: 98 (29 Aug 2019 17:00)  HR: 99 (30 Aug 2019 03:30) (69 - 99)  BP: 126/68 (30 Aug 2019 03:30) (112/81 - 132/70)  BP(mean): --  RR: 20 (30 Aug 2019 03:30) (16 - 20)  SpO2: 99% (30 Aug 2019 03:30) (99% - 100%) I&O's Summary  GENERAL:  [x]Alert  [x]Oriented x3   [ ]Lethargic  [x]Cachexia  [ ]Unarousable  [x]Verbal  [ ]Non-Verbal  Behavioral:   [ ] Anxiety  [ ] Delirium [ ] Agitation [ ] Other  HEENT:  [ ]Normal   [x]Dry mouth   [ ]ET Tube/Trach  [ ]Oral lesions  PULMONARY:   [x]Clear [ ]Tachypnea  [ ]Audible excessive secretions   [ ]Rhonchi        [ ]Right [ ]Left [ ]Bilateral  [ ]Crackles        [ ]Right [ ]Left [ ]Bilateral  [ ]Wheezing     [ ]Right [ ]Left [ ]Bilateral  CARDIOVASCULAR:    [x]Regular [ ]Irregular [ ]Tachy  [ ]Mikey [ ]Murmur [ ]Other  GASTROINTESTINAL:  [x]Soft  [ ]Distended   [x]+BS  [x]Non tender [ ]Tender  [ ]PEG [ ]OGT/ NGT  Last BM:   GENITOURINARY:  [x]Normal [ ] Incontinent   [ ]Oliguria/Anuria   [ ]Carrillo  MUSCULOSKELETAL:   [ ]Normal   [x]Weakness/deconditioning   [ ]Bed/Wheelchair bound [ ]Edema  NEUROLOGIC:   [ ]No focal deficits  [ ] Cognitive impairment  [ ] Dysphagia [ ]Dysarthria [ ] Paresis [ ]Other   SKIN:   [x]Normal   [ ]Pressure ulcer(s)  [ ]Rash    CRITICAL CARE:  [ ] Shock Present  [ ]Septic [ ]Cardiogenic [ ]Neurologic [ ]Hypovolemic  [ ]  Vasopressors [ ]  Inotropes   [ ] Respiratory failure present [ ] mechanical ventilation [ ] non-invasive ventilatory support [ ] High flow  [ ] Acute  [ ] Chronic [ ] Hypoxic  [ ] Hypercarbic [ ] Other  [ ] Other organ failure     LABS:                        9.2    19.61 )-----------( 357      ( 30 Aug 2019 06:00 )             32.2   08-30    140  |  96<L>  |  21  ----------------------------<  132<H>  3.2<L>   |  30  |  0.94    Ca    9.1      30 Aug 2019 06:00          RADIOLOGY & ADDITIONAL STUDIES: < from: CT Head No Cont (08.27.19 @ 07:48) >  INTERPRETATION:  Clinical indication: Altered mental status.    Multiple axial sections were performed from base of skull to vertex   without contrast enhancement. Coronal and sagittal reconstructions were   performed as well    There is evidence of extensive soft tissue prominence seen involving the   adenoid region. This is only partially demonstrated on this study.   Involvement of the bilateral sphenoid sinus is seen with destruction of   the posterior wall of the anterior and posterior sphenoid sinuses. There   is also opacification of both ethmoid sinuses seen left greater right and   complete opacification of the left frontal sinus. This finding is   suspicious for an underlying neoplastic process though the possibility of   underlying infection cannot be entirely excluded. Contrast enhanced CT   scan of the neck can be done to better evaluate the entire process.    Opacification of the left mastoid and middle ear regionis identified.    Parenchymal volume loss in proportion patient's age is seen.    Grossly, there is no evidence acute hemorrhage mass mass effect in the   posterior fossa or supratentorial region.    Impression: Extensive soft tissue mass in the adenoid region with   involvement of the sphenoid sinus and possibly the bilateral ethmoid and   left frontal sinuses.    Opacification of the left mastoid and middle ear regions seen as well.    Contrast enhanced CT scan of the soft tissue neck can be done for further   evaluation.    < end of copied text >      PROTEIN CALORIE MALNUTRITION PRESENT: [x] Yes [ ] No  [ ] PPSV2 < or = to 30% [x] significant weight loss  [x] poor nutritional intake [x] catabolic state [ ] anasarca     Albumin, Serum: 3.3 g/dL (08-28-19 @ 05:40)  Artificial Nutrition [ ]     REFERRALS:   [ ]Chaplaincy  [ ] Hospice  [ ]Child Life  [ ]Social Work  [x]Case management [ ]Holistic Therapy HPI:  53 y/o M w/ nasopharnygeal carcinoma (symptoms began 3/18, biopsy 10/18 revealing non-keratinizing, differentiated nasopharyngeal carcinoma s/p radiation with cisplatin followed by adjuvant cisplatin and 5FU from 11/18 to 5/19 at [facility], now established care with oncologist Dr. Leon, per oncology, tumor refractory/resistant to chemo/radiation, now receiving pembrolizumab, cycle 2 on 8/23/19), presents with 3-4 days of increased falls, patient denies head trauma, generalized weakness, and decreased PO intake. Last immunotherapy reported to be on 8/23/19. Patient currently reports headache, nonradiating, without any nausea, vomiting. No other recent change in symptoms per patient, denies fevers, chills, recent illnesses, difficulty breathing, cough, or chest pain. (27 Aug 2019 16:29)    PERTINENT PM/SXH:   Nasopharyngeal cancer  Smoker  Hypercholesterolemia    Status post cataract extraction    FAMILY HISTORY:  No pertinent family history in first degree relatives    ITEMS NOT CHECKED ARE NOT PRESENT    SOCIAL HISTORY:   Significant other/partner:  [ ]  Children:  [ ]  Buddhism/Spirituality:  Substance hx:  [x]   Tobacco hx:  [ ]   Alcohol hx: [ ]   Home Opioid hx:  [ ] I-Stop Reference No:  Living Situation: [x]Home  [ ]Long term care  [ ]Rehab [ ]Other    ADVANCE DIRECTIVES:    DNR  MOLST  [ ]  Living Will  [ ]   DECISION MAKER(s):  [x] Health Care Proxy(s)  [ ] Surrogate(s)  [ ] Guardian           Name(s): Samra Cadeh Phone Number(s):    BASELINE (I)ADL(s) (prior to admission):  [place]: [x]Total  [ ] Moderate [ ]Dependent    Allergies    No Known Drug Allergies  shellfish (Unknown)    Intolerances    MEDICATIONS  (STANDING):  docusate sodium 100 milliGRAM(s) Oral two times a day  enoxaparin Injectable 40 milliGRAM(s) SubCutaneous every 24 hours  fentaNYL   Patch  25 MICROgram(s)/Hr 1 Patch Transdermal every 72 hours  fluconAZOLE IVPB 200 milliGRAM(s) IV Intermittent every 24 hours  gabapentin   Solution 300 milliGRAM(s) Oral two times a day  levothyroxine 25 MICROGram(s) Oral daily  potassium chloride  10 mEq/100 mL IVPB 10 milliEquivalent(s) IV Intermittent every 1 hour  senna 2 Tablet(s) Oral at bedtime    MEDICATIONS  (PRN):  oxyCODONE    IR 20 milliGRAM(s) Oral every 4 hours PRN Pain    PRESENT SYMPTOMS: [ ]Unable to obtain due to poor mentation   Source if other than patient:  [ ]Family   [ ]Team     Pain: [x] yes [ ] no  QOL impact - severe  Location - left side of head, maxillary regions             Aggravating factors - none  Quality - aching/throbbing  Radiation - "into skull"  Timing- constant  Severity (0-10 scale): 10/10  Minimal acceptable level (0-10 scale): 2    PAIN AD Score:     [email or web address] (press ctrl +  left click to view)    Dyspnea:                           [x]Mild [ ]Moderate [ ]Severe  Anxiety:                             [x]Mild [ ]Moderate [ ]Severe  Fatigue:                             [ ]Mild [x]Moderate [ ]Severe  Nausea:                             [ ]Mild [ ]Moderate [ ]Severe  Loss of appetite:              [ ]Mild [ ]Moderate [ ]Severe  Constipation:                    [ ]Mild [ ]Moderate [ ]Severe    Other Symptoms:  [x]All other review of systems negative     Karnofsky Performance Score/Palliative Performance Status Version 2:     50-60% http://npcrc.org/files/news/palliative_performance_scale_ppsv2.pdf  PHYSICAL EXAM:  Vital Signs Last 24 Hrs  T(C): 36.6 (30 Aug 2019 03:30), Max: 36.7 (29 Aug 2019 17:00)  T(F): 97.8 (30 Aug 2019 03:30), Max: 98 (29 Aug 2019 17:00)  HR: 99 (30 Aug 2019 03:30) (69 - 99)  BP: 126/68 (30 Aug 2019 03:30) (112/81 - 132/70)  BP(mean): --  RR: 20 (30 Aug 2019 03:30) (16 - 20)  SpO2: 99% (30 Aug 2019 03:30) (99% - 100%) I&O's Summary  GENERAL:  [x]Alert  [x]Oriented x3   [ ]Lethargic  [x]Cachexia  [ ]Unarousable  [x]Verbal  [ ]Non-Verbal  Behavioral:   [ ] Anxiety  [ ] Delirium [ ] Agitation [ ] Other  HEENT:  [ ]Normal   [x]Dry mouth [x] rhinorrhea [x] foul smelling odor from mouth   [ ]ET Tube/Trach  [ ]Oral lesions  PULMONARY:   [x]Clear [ ]Tachypnea  [ ]Audible excessive secretions   [ ]Rhonchi        [ ]Right [ ]Left [ ]Bilateral  [ ]Crackles        [ ]Right [ ]Left [ ]Bilateral  [ ]Wheezing     [ ]Right [ ]Left [ ]Bilateral  CARDIOVASCULAR:    [x]Regular [ ]Irregular [ ]Tachy  [ ]Mikey [ ]Murmur [ ]Other  GASTROINTESTINAL:  [x]Soft  [ ]Distended   [x]+BS  [x]Non tender [ ]Tender  [ ]PEG [ ]OGT/ NGT  Last BM:   GENITOURINARY:  [x]Normal [ ] Incontinent   [ ]Oliguria/Anuria   [ ]Carrillo  MUSCULOSKELETAL:   [ ]Normal   [x]Weakness/deconditioning   [ ]Bed/Wheelchair bound [ ]Edema  NEUROLOGIC:   [ ]No focal deficits  [ ] Cognitive impairment  [ ] Dysphagia [ ]Dysarthria [ ] Paresis [ ]Other   SKIN:   [x]Normal   [ ]Pressure ulcer(s)  [ ]Rash    CRITICAL CARE:  [ ] Shock Present  [ ]Septic [ ]Cardiogenic [ ]Neurologic [ ]Hypovolemic  [ ]  Vasopressors [ ]  Inotropes   [ ] Respiratory failure present [ ] mechanical ventilation [ ] non-invasive ventilatory support [ ] High flow  [ ] Acute  [ ] Chronic [ ] Hypoxic  [ ] Hypercarbic [ ] Other  [ ] Other organ failure     LABS:                        9.2    19.61 )-----------( 357      ( 30 Aug 2019 06:00 )             32.2   08-30    140  |  96<L>  |  21  ----------------------------<  132<H>  3.2<L>   |  30  |  0.94    Ca    9.1      30 Aug 2019 06:00          RADIOLOGY & ADDITIONAL STUDIES: < from: CT Head No Cont (08.27.19 @ 07:48) >  INTERPRETATION:  Clinical indication: Altered mental status.    Multiple axial sections were performed from base of skull to vertex   without contrast enhancement. Coronal and sagittal reconstructions were   performed as well    There is evidence of extensive soft tissue prominence seen involving the   adenoid region. This is only partially demonstrated on this study.   Involvement of the bilateral sphenoid sinus is seen with destruction of   the posterior wall of the anterior and posterior sphenoid sinuses. There   is also opacification of both ethmoid sinuses seen left greater right and   complete opacification of the left frontal sinus. This finding is   suspicious for an underlying neoplastic process though the possibility of   underlying infection cannot be entirely excluded. Contrast enhanced CT   scan of the neck can be done to better evaluate the entire process.    Opacification of the left mastoid and middle ear regionis identified.    Parenchymal volume loss in proportion patient's age is seen.    Grossly, there is no evidence acute hemorrhage mass mass effect in the   posterior fossa or supratentorial region.    Impression: Extensive soft tissue mass in the adenoid region with   involvement of the sphenoid sinus and possibly the bilateral ethmoid and   left frontal sinuses.    Opacification of the left mastoid and middle ear regions seen as well.    Contrast enhanced CT scan of the soft tissue neck can be done for further   evaluation.    < end of copied text >      PROTEIN CALORIE MALNUTRITION PRESENT: [x] Yes [ ] No  [ ] PPSV2 < or = to 30% [x] significant weight loss  [x] poor nutritional intake [x] catabolic state [ ] anasarca     Albumin, Serum: 3.3 g/dL (08-28-19 @ 05:40)  Artificial Nutrition [ ]     REFERRALS:   [ ]Chaplaincy  [ ] Hospice  [ ]Child Life  [ ]Social Work  [x]Case management [ ]Holistic Therapy

## 2019-08-30 NOTE — PROGRESS NOTE ADULT - ASSESSMENT
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     1. FTT: likely 2/2 malignancy  - CT head with no ICH  - afebrile, no localizing infectious symptoms  - TSH normal  - on pureed diet  - PT, nutrition    2. Locally advanced nasopharyngeal carcinoma:  - most recently on pembrolizumab, received C2 on 8/23  - MRI results reviewed with extensive and progressive disease  - plan for family meeting at 3 pm today to discuss goals of care 55 yo M with locally advanced nasopharyngeal carcinoma (dx 3/2018) s/p cis/RT followed by adjuvant cisplatin with POD (3/2019) recently started pembrolizumab (C2 on 8/23) p/w FTT, AMS, and falls.     1. FTT: likely 2/2 malignancy  - CT head with no ICH  - afebrile, no localizing infectious symptoms  - TSH normal  - on pureed diet  - PT, nutrition    2. Locally advanced nasopharyngeal carcinoma: refractory to treatment  - most recently on pembrolizumab, received C2 on 8/23  - MRI results reviewed with extensive and progressive disease  - Had a family meeting today with patient, wife (Samra), sister, and multiple other family members. Palliative care also present. Discussed patient's diagnosis, treatments received, and poor response to chemotherapy, RT, and immunotherapy. Reviewed results of recent MRI showing progressive disease despite immunotherapy. Pt's functional status and nutritional status also declining due to underlying malignancy. Discussed lack of good options for further treatment and focusing on symptom management. Pt and family having difficult time accepting at this time and not ready to discuss Hospice. Appreciate Palliative care recommendations in optimizing pain regimen and will have to continue goals of care.    d/w primary team

## 2019-08-30 NOTE — PROGRESS NOTE ADULT - PROBLEM SELECTOR PLAN 1
Likely due to progressive malignancy  - Nutrition and PT evaluation  - s/p cinesophagram recommending puree with Honey Thick Liquids, resume aspiration precautions

## 2019-08-30 NOTE — CONSULT NOTE ADULT - PROBLEM SELECTOR RECOMMENDATION 4
Patient's diagnosis, poor prognosis, and disease trajectory discussed by Dr. Leon. Patient was told that tumor growth persisted despite chemo/radiation and adjuvant therapy. Patient's family present bedside. Hospice services were introduced. Patient appeared overwhelmed due to news of terminal diagnosis and uncontrolled pain. Stated he is "waiting to die". Emotional support provided to patient and family. Will continue to follow for pain management and ongoing discussion for GOC.

## 2019-08-30 NOTE — PROGRESS NOTE ADULT - SUBJECTIVE AND OBJECTIVE BOX
Patient is a 54y old  Male who presents with a chief complaint of falls x 3-4 days (30 Aug 2019 12:38)    SUBJECTIVE / OVERNIGHT EVENTS:  Patient seen complaining that he was moved to a different room.     MEDICATIONS  (STANDING):  docusate sodium 100 milliGRAM(s) Oral two times a day  enoxaparin Injectable 40 milliGRAM(s) SubCutaneous every 24 hours  fentaNYL   Patch  25 MICROgram(s)/Hr 1 Patch Transdermal every 72 hours  fluconAZOLE IVPB 200 milliGRAM(s) IV Intermittent every 24 hours  gabapentin   Solution 300 milliGRAM(s) Oral two times a day  levothyroxine 25 MICROGram(s) Oral daily  potassium chloride  10 mEq/100 mL IVPB 10 milliEquivalent(s) IV Intermittent every 1 hour  senna 2 Tablet(s) Oral at bedtime    MEDICATIONS  (PRN):  oxyCODONE    IR 20 milliGRAM(s) Oral every 4 hours PRN Pain      Vital Signs Last 24 Hrs  T(C): 36.3 (30 Aug 2019 12:57), Max: 36.7 (29 Aug 2019 17:00)  T(F): 97.3 (30 Aug 2019 12:57), Max: 98 (29 Aug 2019 17:00)  HR: 75 (30 Aug 2019 12:57) (69 - 99)  BP: 132/96 (30 Aug 2019 12:57) (112/81 - 132/96)  BP(mean): --  RR: 18 (30 Aug 2019 12:57) (16 - 20)  SpO2: 100% (30 Aug 2019 12:57) (99% - 100%)  CAPILLARY BLOOD GLUCOSE      POCT Blood Glucose.: 129 mg/dL (29 Aug 2019 21:29)  POCT Blood Glucose.: 115 mg/dL (29 Aug 2019 16:59)  POCT Blood Glucose.: 120 mg/dL (29 Aug 2019 15:55)    I&O's Summary      PHYSICAL EXAM  GENERAL: NAD, elderly thin man cooperative with exam  HEAD:  Atraumatic, Normocephalic  EYES: EOMI, PERRLA, conjunctiva and sclera clear  PHARYNX: Diffuse thrush  NECK: Supple, No JVD  CHEST/LUNG: Clear to auscultation bilaterally; No wheeze  HEART: Regular rate and rhythm; No murmurs, rubs, or gallops  ABDOMEN: Soft, Nontender, Nondistended; Bowel sounds present  EXTREMITIES:  2+ Peripheral Pulses, No clubbing, cyanosis, or edema  PSYCH: Alert, follows commands, moving extremities spontaneously   SKIN: No rashes or lesions    LABS:                        9.2    19.61 )-----------( 357      ( 30 Aug 2019 06:00 )             32.2     08-30    140  |  96<L>  |  21  ----------------------------<  132<H>  3.2<L>   |  30  |  0.94    Ca    9.1      30 Aug 2019 06:00                  RADIOLOGY & ADDITIONAL TESTS:    Imaging Personally Reviewed:  Consultant(s) Notes Reviewed:    Care Discussed with Consultants/Other Providers:

## 2019-08-30 NOTE — CONSULT NOTE ADULT - ASSESSMENT
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 Leon, per oncology, tumor refractory/resistant to chemo/radiation, now receiving pembrolizumab (cycle 2 on 8/23/19), presented with 3-4 days of increased falls, patient denies head trauma, generalized weakness, and decreased PO intake. Patient currently reports headache, non-radiating, without any nausea, vomiting. No other recent change in symptoms per patient, denies fevers, chills, recent illnesses, difficulty breathing, cough, or chest pain. Palliative care consulted for GOC in the setting of advanced nasopharyngeal carcinoma refractory to chemo/radiation. 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 Leon, per oncology, tumor refractory/resistant to chemo/radiation, now receiving pembrolizumab (cycle 2 on 8/23/19), presented with 3-4 days of increased falls, patient denies head trauma, generalized weakness, and decreased PO intake. Patient currently reports headache, non-radiating, without any nausea, vomiting. No other recent change in symptoms per patient, denies fevers, chills, recent illnesses, difficulty breathing, cough, or chest pain. Palliative care consulted for GOC in the setting of advanced nasopharyngeal carcinoma refractory to chemotx and immunotherapy.

## 2019-08-30 NOTE — CONSULT NOTE ADULT - PROBLEM SELECTOR RECOMMENDATION 9
Most likely complex pain due to rapid tumor growth with nerve involvement. Patient reports 10/10 head/facial pain, pressure/aching sensation, associated with decrease in hearing and rhinorrhea. Patient was on Fentanyl patch and required oxycodone 20 mg po x2 overnight. Reports pain is poorly controlled.    -Discontinue/Remove Fentanyl patch  -Start Methadone 2.5 mg solution po bid  -c/w Oxycodone IR 20 mg po q4hr prn pain  -c/w Gabapentin  -Will reassess daily requirement and adjust doses based on symptom burden  -c/w bowel regimen

## 2019-08-30 NOTE — PROGRESS NOTE ADULT - SUBJECTIVE AND OBJECTIVE BOX
INTERVAL HPI/OVERNIGHT EVENTS:  Patient S&E at bedside.     MEDICATIONS  (STANDING):  docusate sodium 100 milliGRAM(s) Oral two times a day  enoxaparin Injectable 40 milliGRAM(s) SubCutaneous every 24 hours  gabapentin   Solution 300 milliGRAM(s) Oral two times a day  levothyroxine 25 MICROGram(s) Oral daily  senna 2 Tablet(s) Oral at bedtime    MEDICATIONS  (PRN):  oxyCODONE    IR 20 milliGRAM(s) Oral every 4 hours PRN Pain    Allergies    No Known Drug Allergies  shellfish (Unknown)    Intolerances    Vital Signs Last 24 Hrs  T(C): 36.6 (30 Aug 2019 03:30), Max: 36.7 (29 Aug 2019 17:00)  T(F): 97.8 (30 Aug 2019 03:30), Max: 98 (29 Aug 2019 17:00)  HR: 99 (30 Aug 2019 03:30) (69 - 99)  BP: 126/68 (30 Aug 2019 03:30) (112/81 - 132/70)  BP(mean): --  RR: 20 (30 Aug 2019 03:30) (16 - 20)  SpO2: 99% (30 Aug 2019 03:30) (99% - 100%)    PHYSICAL EXAM:   GENERAL: NAD  HEENT: EOMI  RESP: CTAB  HEART: RRR, S1/S2  ABDOMEN: +BS, soft, NT, ND  EXTREMITIES: no LE edema  NEUROLOGIC: AAO x 3, dysarthric and difficult to understand speech    LABS:                                   9.2    19.61 )-----------( 357      ( 30 Aug 2019 06:00 )             32.2   08-30    140  |  96<L>  |  21  ----------------------------<  132<H>  3.2<L>   |  30  |  0.94    Ca    9.1      30 Aug 2019 06:00    RADIOLOGY & ADDITIONAL TESTS:  Studies reviewed. INTERVAL HPI/OVERNIGHT EVENTS:  Patient S&E at bedside. Pt reporting significant amount of pain, pressure, and headache.     MEDICATIONS  (STANDING):  docusate sodium 100 milliGRAM(s) Oral two times a day  enoxaparin Injectable 40 milliGRAM(s) SubCutaneous every 24 hours  gabapentin   Solution 300 milliGRAM(s) Oral two times a day  levothyroxine 25 MICROGram(s) Oral daily  senna 2 Tablet(s) Oral at bedtime    MEDICATIONS  (PRN):  oxyCODONE    IR 20 milliGRAM(s) Oral every 4 hours PRN Pain    Allergies    No Known Drug Allergies  shellfish (Unknown)    Intolerances    Vital Signs Last 24 Hrs  T(C): 36.6 (30 Aug 2019 03:30), Max: 36.7 (29 Aug 2019 17:00)  T(F): 97.8 (30 Aug 2019 03:30), Max: 98 (29 Aug 2019 17:00)  HR: 99 (30 Aug 2019 03:30) (69 - 99)  BP: 126/68 (30 Aug 2019 03:30) (112/81 - 132/70)  BP(mean): --  RR: 20 (30 Aug 2019 03:30) (16 - 20)  SpO2: 99% (30 Aug 2019 03:30) (99% - 100%)    PHYSICAL EXAM:   GENERAL: uncomfortable due to pain  HEENT: EOMI  RESP: CTAB  HEART: RRR, S1/S2  ABDOMEN: +BS, soft, NT, ND  EXTREMITIES: no LE edema  NEUROLOGIC: AAO x 3, dysarthric and difficult to understand speech    LABS:                                   9.2    19.61 )-----------( 357      ( 30 Aug 2019 06:00 )             32.2   08-30    140  |  96<L>  |  21  ----------------------------<  132<H>  3.2<L>   |  30  |  0.94    Ca    9.1      30 Aug 2019 06:00    RADIOLOGY & ADDITIONAL TESTS:  Studies reviewed.

## 2019-08-30 NOTE — CONSULT NOTE ADULT - ATTENDING COMMENTS
I discussed the case and saw the patient with the fellow and agree with the above with addendum  - please obtain MRI head and neck  - blood transfusion  - nutrition consult  - pain control    Marcia Leon MD
Patient seen and examined.  Agree with fellow note.

## 2019-08-31 LAB
ALBUMIN SERPL ELPH-MCNC: 3.2 G/DL — LOW (ref 3.3–5)
ALP SERPL-CCNC: 57 U/L — SIGNIFICANT CHANGE UP (ref 40–120)
ALT FLD-CCNC: 8 U/L — SIGNIFICANT CHANGE UP (ref 4–41)
ANION GAP SERPL CALC-SCNC: 12 MMO/L — SIGNIFICANT CHANGE UP (ref 7–14)
AST SERPL-CCNC: 12 U/L — SIGNIFICANT CHANGE UP (ref 4–40)
BASOPHILS # BLD AUTO: 0.04 K/UL — SIGNIFICANT CHANGE UP (ref 0–0.2)
BASOPHILS NFR BLD AUTO: 0.2 % — SIGNIFICANT CHANGE UP (ref 0–2)
BILIRUB SERPL-MCNC: 0.3 MG/DL — SIGNIFICANT CHANGE UP (ref 0.2–1.2)
BUN SERPL-MCNC: 16 MG/DL — SIGNIFICANT CHANGE UP (ref 7–23)
CALCIUM SERPL-MCNC: 9.6 MG/DL — SIGNIFICANT CHANGE UP (ref 8.4–10.5)
CHLORIDE SERPL-SCNC: 98 MMOL/L — SIGNIFICANT CHANGE UP (ref 98–107)
CO2 SERPL-SCNC: 31 MMOL/L — SIGNIFICANT CHANGE UP (ref 22–31)
CREAT SERPL-MCNC: 0.88 MG/DL — SIGNIFICANT CHANGE UP (ref 0.5–1.3)
EOSINOPHIL # BLD AUTO: 0.08 K/UL — SIGNIFICANT CHANGE UP (ref 0–0.5)
EOSINOPHIL NFR BLD AUTO: 0.5 % — SIGNIFICANT CHANGE UP (ref 0–6)
GLUCOSE SERPL-MCNC: 98 MG/DL — SIGNIFICANT CHANGE UP (ref 70–99)
HCT VFR BLD CALC: 31 % — LOW (ref 39–50)
HGB BLD-MCNC: 8.8 G/DL — LOW (ref 13–17)
IMM GRANULOCYTES NFR BLD AUTO: 0.5 % — SIGNIFICANT CHANGE UP (ref 0–1.5)
LYMPHOCYTES # BLD AUTO: 0.63 K/UL — LOW (ref 1–3.3)
LYMPHOCYTES # BLD AUTO: 3.8 % — LOW (ref 13–44)
MAGNESIUM SERPL-MCNC: 1.7 MG/DL — SIGNIFICANT CHANGE UP (ref 1.6–2.6)
MCHC RBC-ENTMCNC: 19.4 PG — LOW (ref 27–34)
MCHC RBC-ENTMCNC: 28.4 % — LOW (ref 32–36)
MCV RBC AUTO: 68.3 FL — LOW (ref 80–100)
MONOCYTES # BLD AUTO: 0.7 K/UL — SIGNIFICANT CHANGE UP (ref 0–0.9)
MONOCYTES NFR BLD AUTO: 4.2 % — SIGNIFICANT CHANGE UP (ref 2–14)
NEUTROPHILS # BLD AUTO: 14.98 K/UL — HIGH (ref 1.8–7.4)
NEUTROPHILS NFR BLD AUTO: 90.8 % — HIGH (ref 43–77)
NRBC # FLD: 0 K/UL — SIGNIFICANT CHANGE UP (ref 0–0)
PHOSPHATE SERPL-MCNC: 3.3 MG/DL — SIGNIFICANT CHANGE UP (ref 2.5–4.5)
PLATELET # BLD AUTO: 414 K/UL — HIGH (ref 150–400)
PMV BLD: 10.6 FL — SIGNIFICANT CHANGE UP (ref 7–13)
POTASSIUM SERPL-MCNC: 3.8 MMOL/L — SIGNIFICANT CHANGE UP (ref 3.5–5.3)
POTASSIUM SERPL-SCNC: 3.8 MMOL/L — SIGNIFICANT CHANGE UP (ref 3.5–5.3)
PROT SERPL-MCNC: 6.9 G/DL — SIGNIFICANT CHANGE UP (ref 6–8.3)
RBC # BLD: 4.54 M/UL — SIGNIFICANT CHANGE UP (ref 4.2–5.8)
RBC # FLD: 15.4 % — HIGH (ref 10.3–14.5)
SODIUM SERPL-SCNC: 141 MMOL/L — SIGNIFICANT CHANGE UP (ref 135–145)
WBC # BLD: 16.51 K/UL — HIGH (ref 3.8–10.5)
WBC # FLD AUTO: 16.51 K/UL — HIGH (ref 3.8–10.5)

## 2019-08-31 PROCEDURE — 99233 SBSQ HOSP IP/OBS HIGH 50: CPT

## 2019-08-31 RX ORDER — ACETAMINOPHEN 500 MG
650 TABLET ORAL ONCE
Refills: 0 | Status: COMPLETED | OUTPATIENT
Start: 2019-08-31 | End: 2019-08-31

## 2019-08-31 RX ORDER — CHLORHEXIDINE GLUCONATE 213 G/1000ML
1 SOLUTION TOPICAL
Refills: 0 | Status: DISCONTINUED | OUTPATIENT
Start: 2019-08-31 | End: 2019-09-07

## 2019-08-31 RX ADMIN — FLUCONAZOLE 100 MILLIGRAM(S): 150 TABLET ORAL at 15:03

## 2019-08-31 RX ADMIN — GABAPENTIN 300 MILLIGRAM(S): 400 CAPSULE ORAL at 07:20

## 2019-08-31 RX ADMIN — Medication 25 MICROGRAM(S): at 07:19

## 2019-08-31 RX ADMIN — Medication 100 MILLIGRAM(S): at 23:28

## 2019-08-31 RX ADMIN — METHADONE HYDROCHLORIDE 2.5 MILLIGRAM(S): 40 TABLET ORAL at 07:19

## 2019-08-31 RX ADMIN — FENTANYL CITRATE 1 PATCH: 50 INJECTION INTRAVENOUS at 07:10

## 2019-08-31 RX ADMIN — ENOXAPARIN SODIUM 40 MILLIGRAM(S): 100 INJECTION SUBCUTANEOUS at 07:29

## 2019-08-31 RX ADMIN — Medication 100 MILLIGRAM(S): at 10:05

## 2019-08-31 RX ADMIN — Medication 650 MILLIGRAM(S): at 13:31

## 2019-08-31 RX ADMIN — SENNA PLUS 2 TABLET(S): 8.6 TABLET ORAL at 23:28

## 2019-08-31 RX ADMIN — Medication 650 MILLIGRAM(S): at 14:30

## 2019-08-31 NOTE — PROGRESS NOTE ADULT - SUBJECTIVE AND OBJECTIVE BOX
Patient is a 54y old  Male who presents with a chief complaint of falls x 3-4 days (30 Aug 2019 13:11)    SUBJECTIVE / OVERNIGHT EVENTS:  Patient seen denying any complaints.     MEDICATIONS  (STANDING):  docusate sodium Liquid 100 milliGRAM(s) Oral two times a day  enoxaparin Injectable 40 milliGRAM(s) SubCutaneous every 24 hours  fluconAZOLE IVPB 200 milliGRAM(s) IV Intermittent every 24 hours  gabapentin   Solution 300 milliGRAM(s) Oral two times a day  levothyroxine 25 MICROGram(s) Oral daily  methadone   Solution 2.5 milliGRAM(s) Oral two times a day  senna 2 Tablet(s) Oral at bedtime    MEDICATIONS  (PRN):  oxyCODONE    IR 20 milliGRAM(s) Oral every 4 hours PRN Pain      Vital Signs Last 24 Hrs  T(C): 36.4 (31 Aug 2019 07:06), Max: 36.4 (30 Aug 2019 21:59)  T(F): 97.5 (31 Aug 2019 07:06), Max: 97.6 (30 Aug 2019 21:59)  HR: 90 (31 Aug 2019 07:06) (75 - 90)  BP: 122/82 (31 Aug 2019 07:06) (122/82 - 137/83)  BP(mean): --  RR: 16 (31 Aug 2019 07:06) (16 - 18)  SpO2: 99% (31 Aug 2019 07:06) (99% - 100%)  CAPILLARY BLOOD GLUCOSE        I&O's Summary        PHYSICAL EXAM  GENERAL: NAD, well-developed  HEAD:  Atraumatic, Normocephalic  EYES: EOMI, PERRLA, conjunctiva and sclera clear  NECK: Supple, No JVD  CHEST/LUNG: Clear to auscultation bilaterally; No wheeze  HEART: Regular rate and rhythm; No murmurs, rubs, or gallops  ABDOMEN: Soft, Nontender, Nondistended; Bowel sounds present  EXTREMITIES:  2+ Peripheral Pulses, No clubbing, cyanosis, or edema  PSYCH: AAOx3  SKIN: No rashes or lesions    LABS:                        8.8    16.51 )-----------( 414      ( 31 Aug 2019 07:00 )             31.0     08-31    141  |  98  |  16  ----------------------------<  98  3.8   |  31  |  0.88    Ca    9.6      31 Aug 2019 07:00  Phos  3.3     08-31  Mg     1.7     08-31    TPro  6.9  /  Alb  3.2<L>  /  TBili  0.3  /  DBili  x   /  AST  12  /  ALT  8   /  AlkPhos  57  08-31                RADIOLOGY & ADDITIONAL TESTS:    Imaging Personally Reviewed:  Consultant(s) Notes Reviewed:    Care Discussed with Consultants/Other Providers: Patient is a 54y old  Male who presents with a chief complaint of falls x 3-4 days (30 Aug 2019 13:11)    SUBJECTIVE / OVERNIGHT EVENTS:  Patient seen denying any complaints.     MEDICATIONS  (STANDING):  docusate sodium Liquid 100 milliGRAM(s) Oral two times a day  enoxaparin Injectable 40 milliGRAM(s) SubCutaneous every 24 hours  fluconAZOLE IVPB 200 milliGRAM(s) IV Intermittent every 24 hours  gabapentin   Solution 300 milliGRAM(s) Oral two times a day  levothyroxine 25 MICROGram(s) Oral daily  methadone   Solution 2.5 milliGRAM(s) Oral two times a day  senna 2 Tablet(s) Oral at bedtime    MEDICATIONS  (PRN):  oxyCODONE    IR 20 milliGRAM(s) Oral every 4 hours PRN Pain      Vital Signs Last 24 Hrs  T(C): 36.4 (31 Aug 2019 07:06), Max: 36.4 (30 Aug 2019 21:59)  T(F): 97.5 (31 Aug 2019 07:06), Max: 97.6 (30 Aug 2019 21:59)  HR: 90 (31 Aug 2019 07:06) (75 - 90)  BP: 122/82 (31 Aug 2019 07:06) (122/82 - 137/83)  BP(mean): --  RR: 16 (31 Aug 2019 07:06) (16 - 18)  SpO2: 99% (31 Aug 2019 07:06) (99% - 100%)  CAPILLARY BLOOD GLUCOSE        I&O's Summary        PHYSICAL EXAM  GENERAL: NAD, elderly thin man cooperative with exam  HEAD:  Atraumatic, Normocephalic  EYES: EOMI, PERRLA, conjunctiva and sclera clear  PHARYNX: Diffuse thrush  NECK: Supple, No JVD  CHEST/LUNG: Clear to auscultation bilaterally; No wheeze  HEART: Regular rate and rhythm; No murmurs, rubs, or gallops  ABDOMEN: Soft, Nontender, Nondistended; Bowel sounds present  EXTREMITIES:  2+ Peripheral Pulses, No clubbing, cyanosis, or edema  PSYCH: Alert, follows commands, moving extremities spontaneously   SKIN: No rashes or lesions      LABS:                        8.8    16.51 )-----------( 414      ( 31 Aug 2019 07:00 )             31.0     08-31    141  |  98  |  16  ----------------------------<  98  3.8   |  31  |  0.88    Ca    9.6      31 Aug 2019 07:00  Phos  3.3     08-31  Mg     1.7     08-31    TPro  6.9  /  Alb  3.2<L>  /  TBili  0.3  /  DBili  x   /  AST  12  /  ALT  8   /  AlkPhos  57  08-31                RADIOLOGY & ADDITIONAL TESTS:    Imaging Personally Reviewed:  Consultant(s) Notes Reviewed:    Care Discussed with Consultants/Other Providers:

## 2019-09-01 LAB
ALBUMIN SERPL ELPH-MCNC: 3.2 G/DL — LOW (ref 3.3–5)
ALP SERPL-CCNC: 59 U/L — SIGNIFICANT CHANGE UP (ref 40–120)
ALT FLD-CCNC: 10 U/L — SIGNIFICANT CHANGE UP (ref 4–41)
ANION GAP SERPL CALC-SCNC: 12 MMO/L — SIGNIFICANT CHANGE UP (ref 7–14)
AST SERPL-CCNC: 12 U/L — SIGNIFICANT CHANGE UP (ref 4–40)
BASOPHILS # BLD AUTO: 0.07 K/UL — SIGNIFICANT CHANGE UP (ref 0–0.2)
BASOPHILS NFR BLD AUTO: 0.4 % — SIGNIFICANT CHANGE UP (ref 0–2)
BILIRUB SERPL-MCNC: 0.3 MG/DL — SIGNIFICANT CHANGE UP (ref 0.2–1.2)
BUN SERPL-MCNC: 17 MG/DL — SIGNIFICANT CHANGE UP (ref 7–23)
CALCIUM SERPL-MCNC: 9.5 MG/DL — SIGNIFICANT CHANGE UP (ref 8.4–10.5)
CHLORIDE SERPL-SCNC: 96 MMOL/L — LOW (ref 98–107)
CO2 SERPL-SCNC: 31 MMOL/L — SIGNIFICANT CHANGE UP (ref 22–31)
CREAT SERPL-MCNC: 0.88 MG/DL — SIGNIFICANT CHANGE UP (ref 0.5–1.3)
EOSINOPHIL # BLD AUTO: 0.09 K/UL — SIGNIFICANT CHANGE UP (ref 0–0.5)
EOSINOPHIL NFR BLD AUTO: 0.5 % — SIGNIFICANT CHANGE UP (ref 0–6)
GLUCOSE SERPL-MCNC: 91 MG/DL — SIGNIFICANT CHANGE UP (ref 70–99)
HCT VFR BLD CALC: 30.5 % — LOW (ref 39–50)
HGB BLD-MCNC: 8.6 G/DL — LOW (ref 13–17)
IMM GRANULOCYTES NFR BLD AUTO: 1.6 % — HIGH (ref 0–1.5)
LYMPHOCYTES # BLD AUTO: 0.54 K/UL — LOW (ref 1–3.3)
LYMPHOCYTES # BLD AUTO: 3.1 % — LOW (ref 13–44)
MAGNESIUM SERPL-MCNC: 1.7 MG/DL — SIGNIFICANT CHANGE UP (ref 1.6–2.6)
MCHC RBC-ENTMCNC: 19.1 PG — LOW (ref 27–34)
MCHC RBC-ENTMCNC: 28.2 % — LOW (ref 32–36)
MCV RBC AUTO: 67.8 FL — LOW (ref 80–100)
MONOCYTES # BLD AUTO: 0.76 K/UL — SIGNIFICANT CHANGE UP (ref 0–0.9)
MONOCYTES NFR BLD AUTO: 4.4 % — SIGNIFICANT CHANGE UP (ref 2–14)
NEUTROPHILS # BLD AUTO: 15.43 K/UL — HIGH (ref 1.8–7.4)
NEUTROPHILS NFR BLD AUTO: 90 % — HIGH (ref 43–77)
NRBC # FLD: 0 K/UL — SIGNIFICANT CHANGE UP (ref 0–0)
PHOSPHATE SERPL-MCNC: 3.3 MG/DL — SIGNIFICANT CHANGE UP (ref 2.5–4.5)
PLATELET # BLD AUTO: 384 K/UL — SIGNIFICANT CHANGE UP (ref 150–400)
PMV BLD: 9.8 FL — SIGNIFICANT CHANGE UP (ref 7–13)
POTASSIUM SERPL-MCNC: 3.4 MMOL/L — LOW (ref 3.5–5.3)
POTASSIUM SERPL-SCNC: 3.4 MMOL/L — LOW (ref 3.5–5.3)
PROT SERPL-MCNC: 6.9 G/DL — SIGNIFICANT CHANGE UP (ref 6–8.3)
RBC # BLD: 4.5 M/UL — SIGNIFICANT CHANGE UP (ref 4.2–5.8)
RBC # FLD: 15.4 % — HIGH (ref 10.3–14.5)
SODIUM SERPL-SCNC: 139 MMOL/L — SIGNIFICANT CHANGE UP (ref 135–145)
WBC # BLD: 17.16 K/UL — HIGH (ref 3.8–10.5)
WBC # FLD AUTO: 17.16 K/UL — HIGH (ref 3.8–10.5)

## 2019-09-01 PROCEDURE — 70540 MRI ORBIT/FACE/NECK W/O DYE: CPT | Mod: 26

## 2019-09-01 PROCEDURE — 99233 SBSQ HOSP IP/OBS HIGH 50: CPT

## 2019-09-01 RX ORDER — ACETAMINOPHEN 500 MG
650 TABLET ORAL EVERY 6 HOURS
Refills: 0 | Status: DISCONTINUED | OUTPATIENT
Start: 2019-09-01 | End: 2019-09-07

## 2019-09-01 RX ORDER — POTASSIUM CHLORIDE 20 MEQ
40 PACKET (EA) ORAL ONCE
Refills: 0 | Status: DISCONTINUED | OUTPATIENT
Start: 2019-09-01 | End: 2019-09-01

## 2019-09-01 RX ORDER — POTASSIUM CHLORIDE 20 MEQ
40 PACKET (EA) ORAL ONCE
Refills: 0 | Status: COMPLETED | OUTPATIENT
Start: 2019-09-01 | End: 2019-09-01

## 2019-09-01 RX ADMIN — Medication 650 MILLIGRAM(S): at 11:22

## 2019-09-01 RX ADMIN — Medication 650 MILLIGRAM(S): at 12:15

## 2019-09-01 RX ADMIN — Medication 40 MILLIEQUIVALENT(S): at 16:01

## 2019-09-01 RX ADMIN — ENOXAPARIN SODIUM 40 MILLIGRAM(S): 100 INJECTION SUBCUTANEOUS at 06:46

## 2019-09-01 RX ADMIN — CHLORHEXIDINE GLUCONATE 1 APPLICATION(S): 213 SOLUTION TOPICAL at 06:47

## 2019-09-01 RX ADMIN — Medication 25 MICROGRAM(S): at 06:47

## 2019-09-01 RX ADMIN — FLUCONAZOLE 100 MILLIGRAM(S): 150 TABLET ORAL at 15:51

## 2019-09-01 NOTE — PROVIDER CONTACT NOTE (MEDICATION) - REASON
Methadone 2.5mg due, pt lethargic throughout shift.
Patient complaining of pain, refused oxycodone requesting tylenol

## 2019-09-01 NOTE — PROGRESS NOTE ADULT - SUBJECTIVE AND OBJECTIVE BOX
Patient is a 54y old  Male who presents with a chief complaint of falls x 3-4 days (31 Aug 2019 14:33)    SUBJECTIVE / OVERNIGHT EVENTS:  Patient seen denying any complaints, asking if the mass at the roof of his mouth is cancerous.     MEDICATIONS  (STANDING):  chlorhexidine 4% Liquid 1 Application(s) Topical <User Schedule>  docusate sodium Liquid 100 milliGRAM(s) Oral two times a day  enoxaparin Injectable 40 milliGRAM(s) SubCutaneous every 24 hours  fluconAZOLE IVPB 200 milliGRAM(s) IV Intermittent every 24 hours  gabapentin   Solution 300 milliGRAM(s) Oral two times a day  levothyroxine 25 MICROGram(s) Oral daily  methadone   Solution 2.5 milliGRAM(s) Oral two times a day  potassium chloride   Powder 40 milliEquivalent(s) Oral once  senna 2 Tablet(s) Oral at bedtime    MEDICATIONS  (PRN):  acetaminophen    Suspension .. 650 milliGRAM(s) Oral every 6 hours PRN Mild Pain (1 - 3)  oxyCODONE    IR 20 milliGRAM(s) Oral every 4 hours PRN Pain      Vital Signs Last 24 Hrs  T(C): 36.4 (01 Sep 2019 13:29), Max: 36.7 (31 Aug 2019 14:57)  T(F): 97.5 (01 Sep 2019 13:29), Max: 98.1 (31 Aug 2019 14:57)  HR: 76 (01 Sep 2019 13:29) (75 - 81)  BP: 139/86 (01 Sep 2019 13:29) (113/83 - 139/86)  BP(mean): --  RR: 18 (01 Sep 2019 06:35) (18 - 19)  SpO2: 99% (01 Sep 2019 13:29) (99% - 99%)  CAPILLARY BLOOD GLUCOSE        I&O's Summary    PHYSICAL EXAM  GENERAL: NAD, elderly thin man cooperative with exam  HEAD:  Atraumatic, Normocephalic  EYES: EOMI, PERRLA, conjunctiva and sclera clear  PHARYNX: Some residual thrush, roof of mouth with 3x3 yellow/green lesion ? tumor  NECK: Supple, No JVD  CHEST/LUNG: Clear to auscultation bilaterally; No wheeze  HEART: Regular rate and rhythm; No murmurs, rubs, or gallops  ABDOMEN: Soft, Nontender, Nondistended; Bowel sounds present  EXTREMITIES:  2+ Peripheral Pulses, No clubbing, cyanosis, or edema  PSYCH: Alert, follows commands, moving extremities spontaneously   SKIN: No rashes or lesions    LABS:                        8.6    17.16 )-----------( 384      ( 01 Sep 2019 06:30 )             30.5     09-01    139  |  96<L>  |  17  ----------------------------<  91  3.4<L>   |  31  |  0.88    Ca    9.5      01 Sep 2019 06:30  Phos  3.3     09-01  Mg     1.7     09-01    TPro  6.9  /  Alb  3.2<L>  /  TBili  0.3  /  DBili  x   /  AST  12  /  ALT  10  /  AlkPhos  59  09-01                RADIOLOGY & ADDITIONAL TESTS:    Imaging Personally Reviewed:  Consultant(s) Notes Reviewed:    Care Discussed with Consultants/Other Providers:

## 2019-09-01 NOTE — PROVIDER CONTACT NOTE (MEDICATION) - SITUATION
Patient complaining of pain, refused oxycodone requesting tylenol
Methadone 2.5mg due, pt lethargic throughout shift.

## 2019-09-02 LAB
ALBUMIN SERPL ELPH-MCNC: 3.1 G/DL — LOW (ref 3.3–5)
ALP SERPL-CCNC: 64 U/L — SIGNIFICANT CHANGE UP (ref 40–120)
ALT FLD-CCNC: 10 U/L — SIGNIFICANT CHANGE UP (ref 4–41)
ANION GAP SERPL CALC-SCNC: 13 MMO/L — SIGNIFICANT CHANGE UP (ref 7–14)
AST SERPL-CCNC: 16 U/L — SIGNIFICANT CHANGE UP (ref 4–40)
BASOPHILS # BLD AUTO: 0.05 K/UL — SIGNIFICANT CHANGE UP (ref 0–0.2)
BASOPHILS NFR BLD AUTO: 0.3 % — SIGNIFICANT CHANGE UP (ref 0–2)
BILIRUB SERPL-MCNC: 0.2 MG/DL — SIGNIFICANT CHANGE UP (ref 0.2–1.2)
BUN SERPL-MCNC: 25 MG/DL — HIGH (ref 7–23)
CALCIUM SERPL-MCNC: 9.5 MG/DL — SIGNIFICANT CHANGE UP (ref 8.4–10.5)
CHLORIDE SERPL-SCNC: 95 MMOL/L — LOW (ref 98–107)
CO2 SERPL-SCNC: 29 MMOL/L — SIGNIFICANT CHANGE UP (ref 22–31)
CREAT SERPL-MCNC: 0.85 MG/DL — SIGNIFICANT CHANGE UP (ref 0.5–1.3)
EOSINOPHIL # BLD AUTO: 0.12 K/UL — SIGNIFICANT CHANGE UP (ref 0–0.5)
EOSINOPHIL NFR BLD AUTO: 0.7 % — SIGNIFICANT CHANGE UP (ref 0–6)
GLUCOSE SERPL-MCNC: 108 MG/DL — HIGH (ref 70–99)
HCT VFR BLD CALC: 30.6 % — LOW (ref 39–50)
HGB BLD-MCNC: 9.3 G/DL — LOW (ref 13–17)
IMM GRANULOCYTES NFR BLD AUTO: 0.5 % — SIGNIFICANT CHANGE UP (ref 0–1.5)
LYMPHOCYTES # BLD AUTO: 0.76 K/UL — LOW (ref 1–3.3)
LYMPHOCYTES # BLD AUTO: 4.7 % — LOW (ref 13–44)
MAGNESIUM SERPL-MCNC: 1.8 MG/DL — SIGNIFICANT CHANGE UP (ref 1.6–2.6)
MANUAL SMEAR VERIFICATION: SIGNIFICANT CHANGE UP
MCHC RBC-ENTMCNC: 19.9 PG — LOW (ref 27–34)
MCHC RBC-ENTMCNC: 30.4 % — LOW (ref 32–36)
MCV RBC AUTO: 65.5 FL — LOW (ref 80–100)
MONOCYTES # BLD AUTO: 0.9 K/UL — SIGNIFICANT CHANGE UP (ref 0–0.9)
MONOCYTES NFR BLD AUTO: 5.5 % — SIGNIFICANT CHANGE UP (ref 2–14)
NEUTROPHILS # BLD AUTO: 14.43 K/UL — HIGH (ref 1.8–7.4)
NEUTROPHILS NFR BLD AUTO: 88.3 % — HIGH (ref 43–77)
NRBC # FLD: 0.02 K/UL — SIGNIFICANT CHANGE UP (ref 0–0)
PHOSPHATE SERPL-MCNC: 2.5 MG/DL — SIGNIFICANT CHANGE UP (ref 2.5–4.5)
PLATELET # BLD AUTO: 368 K/UL — SIGNIFICANT CHANGE UP (ref 150–400)
PMV BLD: 10.9 FL — SIGNIFICANT CHANGE UP (ref 7–13)
POTASSIUM SERPL-MCNC: 4 MMOL/L — SIGNIFICANT CHANGE UP (ref 3.5–5.3)
POTASSIUM SERPL-SCNC: 4 MMOL/L — SIGNIFICANT CHANGE UP (ref 3.5–5.3)
PROT SERPL-MCNC: 7 G/DL — SIGNIFICANT CHANGE UP (ref 6–8.3)
RBC # BLD: 4.67 M/UL — SIGNIFICANT CHANGE UP (ref 4.2–5.8)
RBC # FLD: 15.7 % — HIGH (ref 10.3–14.5)
SODIUM SERPL-SCNC: 137 MMOL/L — SIGNIFICANT CHANGE UP (ref 135–145)
WBC # BLD: 16.34 K/UL — HIGH (ref 3.8–10.5)
WBC # FLD AUTO: 16.34 K/UL — HIGH (ref 3.8–10.5)

## 2019-09-02 PROCEDURE — 99233 SBSQ HOSP IP/OBS HIGH 50: CPT

## 2019-09-02 RX ADMIN — FLUCONAZOLE 100 MILLIGRAM(S): 150 TABLET ORAL at 15:52

## 2019-09-02 RX ADMIN — Medication 650 MILLIGRAM(S): at 05:45

## 2019-09-02 RX ADMIN — METHADONE HYDROCHLORIDE 2.5 MILLIGRAM(S): 40 TABLET ORAL at 18:48

## 2019-09-02 RX ADMIN — Medication 650 MILLIGRAM(S): at 12:13

## 2019-09-02 RX ADMIN — ENOXAPARIN SODIUM 40 MILLIGRAM(S): 100 INJECTION SUBCUTANEOUS at 05:15

## 2019-09-02 RX ADMIN — OXYCODONE HYDROCHLORIDE 20 MILLIGRAM(S): 5 TABLET ORAL at 20:44

## 2019-09-02 RX ADMIN — Medication 650 MILLIGRAM(S): at 05:15

## 2019-09-02 RX ADMIN — OXYCODONE HYDROCHLORIDE 20 MILLIGRAM(S): 5 TABLET ORAL at 21:44

## 2019-09-02 RX ADMIN — GABAPENTIN 300 MILLIGRAM(S): 400 CAPSULE ORAL at 18:48

## 2019-09-02 RX ADMIN — OXYCODONE HYDROCHLORIDE 20 MILLIGRAM(S): 5 TABLET ORAL at 15:51

## 2019-09-02 RX ADMIN — Medication 650 MILLIGRAM(S): at 13:06

## 2019-09-02 RX ADMIN — GABAPENTIN 300 MILLIGRAM(S): 400 CAPSULE ORAL at 05:15

## 2019-09-02 RX ADMIN — CHLORHEXIDINE GLUCONATE 1 APPLICATION(S): 213 SOLUTION TOPICAL at 05:16

## 2019-09-02 RX ADMIN — Medication 25 MICROGRAM(S): at 05:14

## 2019-09-02 RX ADMIN — OXYCODONE HYDROCHLORIDE 20 MILLIGRAM(S): 5 TABLET ORAL at 16:22

## 2019-09-02 NOTE — PROGRESS NOTE ADULT - SUBJECTIVE AND OBJECTIVE BOX
Patient is a 54y old  Male who presents with a chief complaint of falls x 3-4 days (01 Sep 2019 13:46)    SUBJECTIVE / OVERNIGHT EVENTS:  Patient seen denying any complaints.     MEDICATIONS  (STANDING):  chlorhexidine 4% Liquid 1 Application(s) Topical <User Schedule>  docusate sodium Liquid 100 milliGRAM(s) Oral two times a day  enoxaparin Injectable 40 milliGRAM(s) SubCutaneous every 24 hours  fluconAZOLE IVPB 200 milliGRAM(s) IV Intermittent every 24 hours  gabapentin   Solution 300 milliGRAM(s) Oral two times a day  levothyroxine 25 MICROGram(s) Oral daily  methadone   Solution 2.5 milliGRAM(s) Oral two times a day  senna 2 Tablet(s) Oral at bedtime    MEDICATIONS  (PRN):  acetaminophen    Suspension .. 650 milliGRAM(s) Oral every 6 hours PRN Mild Pain (1 - 3)  oxyCODONE    IR 20 milliGRAM(s) Oral every 4 hours PRN Pain      Vital Signs Last 24 Hrs  T(C): 36.7 (02 Sep 2019 05:08), Max: 36.7 (02 Sep 2019 05:08)  T(F): 98 (02 Sep 2019 05:08), Max: 98 (02 Sep 2019 05:08)  HR: 86 (02 Sep 2019 05:08) (86 - 92)  BP: 111/84 (02 Sep 2019 05:08) (111/84 - 113/81)  BP(mean): --  RR: 18 (02 Sep 2019 05:08) (18 - 18)  SpO2: 98% (02 Sep 2019 05:08) (98% - 99%)  CAPILLARY BLOOD GLUCOSE        I&O's Summary      PHYSICAL EXAM  GENERAL: NAD, elderly thin man cooperative with exam  HEAD:  Atraumatic, Normocephalic  EYES: EOMI, PERRLA, conjunctiva and sclera clear  PHARYNX: No more thrush, clear  NECK: Supple, No JVD  CHEST/LUNG: Clear to auscultation bilaterally; No wheeze  HEART: Regular rate and rhythm; No murmurs, rubs, or gallops  ABDOMEN: Soft, Nontender, Nondistended; Bowel sounds present  EXTREMITIES:  2+ Peripheral Pulses, No clubbing, cyanosis, or edema  PSYCH: Alert, follows commands, moving extremities spontaneously   SKIN: No rashes or lesions      LABS:                        9.3    16.34 )-----------( 368      ( 02 Sep 2019 07:15 )             30.6     09-02    137  |  95<L>  |  25<H>  ----------------------------<  108<H>  4.0   |  29  |  0.85    Ca    9.5      02 Sep 2019 07:15  Phos  2.5     09-02  Mg     1.8     09-02    TPro  7.0  /  Alb  3.1<L>  /  TBili  0.2  /  DBili  x   /  AST  16  /  ALT  10  /  AlkPhos  64  09-02                RADIOLOGY & ADDITIONAL TESTS:    Imaging Personally Reviewed:  Consultant(s) Notes Reviewed:    Care Discussed with Consultants/Other Providers:

## 2019-09-03 LAB
ALBUMIN SERPL ELPH-MCNC: 3.4 G/DL — SIGNIFICANT CHANGE UP (ref 3.3–5)
ALP SERPL-CCNC: 69 U/L — SIGNIFICANT CHANGE UP (ref 40–120)
ALT FLD-CCNC: 13 U/L — SIGNIFICANT CHANGE UP (ref 4–41)
ANION GAP SERPL CALC-SCNC: 12 MMO/L — SIGNIFICANT CHANGE UP (ref 7–14)
ANISOCYTOSIS BLD QL: SLIGHT — SIGNIFICANT CHANGE UP
AST SERPL-CCNC: 15 U/L — SIGNIFICANT CHANGE UP (ref 4–40)
BASOPHILS # BLD AUTO: 0.1 K/UL — SIGNIFICANT CHANGE UP (ref 0–0.2)
BASOPHILS NFR BLD AUTO: 0.4 % — SIGNIFICANT CHANGE UP (ref 0–2)
BASOPHILS NFR SPEC: 0 % — SIGNIFICANT CHANGE UP (ref 0–2)
BILIRUB SERPL-MCNC: < 0.2 MG/DL — LOW (ref 0.2–1.2)
BLASTS # FLD: 0 % — SIGNIFICANT CHANGE UP (ref 0–0)
BUN SERPL-MCNC: 20 MG/DL — SIGNIFICANT CHANGE UP (ref 7–23)
CALCIUM SERPL-MCNC: 9.9 MG/DL — SIGNIFICANT CHANGE UP (ref 8.4–10.5)
CHLORIDE SERPL-SCNC: 94 MMOL/L — LOW (ref 98–107)
CO2 SERPL-SCNC: 33 MMOL/L — HIGH (ref 22–31)
CREAT SERPL-MCNC: 1.01 MG/DL — SIGNIFICANT CHANGE UP (ref 0.5–1.3)
DACRYOCYTES BLD QL SMEAR: SLIGHT — SIGNIFICANT CHANGE UP
EOSINOPHIL # BLD AUTO: 0.08 K/UL — SIGNIFICANT CHANGE UP (ref 0–0.5)
EOSINOPHIL NFR BLD AUTO: 0.3 % — SIGNIFICANT CHANGE UP (ref 0–6)
EOSINOPHIL NFR FLD: 0.9 % — SIGNIFICANT CHANGE UP (ref 0–6)
GIANT PLATELETS BLD QL SMEAR: PRESENT — SIGNIFICANT CHANGE UP
GLUCOSE SERPL-MCNC: 138 MG/DL — HIGH (ref 70–99)
HCT VFR BLD CALC: 34.7 % — LOW (ref 39–50)
HGB BLD-MCNC: 10 G/DL — LOW (ref 13–17)
HYPOCHROMIA BLD QL: SIGNIFICANT CHANGE UP
IMM GRANULOCYTES NFR BLD AUTO: 0.9 % — SIGNIFICANT CHANGE UP (ref 0–1.5)
LYMPHOCYTES # BLD AUTO: 0.59 K/UL — LOW (ref 1–3.3)
LYMPHOCYTES # BLD AUTO: 2.5 % — LOW (ref 13–44)
LYMPHOCYTES NFR SPEC AUTO: 2.6 % — LOW (ref 13–44)
MAGNESIUM SERPL-MCNC: 1.8 MG/DL — SIGNIFICANT CHANGE UP (ref 1.6–2.6)
MCHC RBC-ENTMCNC: 19.5 PG — LOW (ref 27–34)
MCHC RBC-ENTMCNC: 28.8 % — LOW (ref 32–36)
MCV RBC AUTO: 67.6 FL — LOW (ref 80–100)
METAMYELOCYTES # FLD: 0 % — SIGNIFICANT CHANGE UP (ref 0–1)
MICROCYTES BLD QL: SIGNIFICANT CHANGE UP
MONOCYTES # BLD AUTO: 1.41 K/UL — HIGH (ref 0–0.9)
MONOCYTES NFR BLD AUTO: 5.9 % — SIGNIFICANT CHANGE UP (ref 2–14)
MONOCYTES NFR BLD: 5.2 % — SIGNIFICANT CHANGE UP (ref 2–9)
MYELOCYTES NFR BLD: 0 % — SIGNIFICANT CHANGE UP (ref 0–0)
NEUTROPHIL AB SER-ACNC: 87.8 % — HIGH (ref 43–77)
NEUTROPHILS # BLD AUTO: 21.5 K/UL — HIGH (ref 1.8–7.4)
NEUTROPHILS NFR BLD AUTO: 90 % — HIGH (ref 43–77)
NEUTS BAND # BLD: 0 % — SIGNIFICANT CHANGE UP (ref 0–6)
NRBC # FLD: 0 K/UL — SIGNIFICANT CHANGE UP (ref 0–0)
OTHER - HEMATOLOGY %: 0 — SIGNIFICANT CHANGE UP
OVALOCYTES BLD QL SMEAR: SLIGHT — SIGNIFICANT CHANGE UP
PHOSPHATE SERPL-MCNC: 4.6 MG/DL — HIGH (ref 2.5–4.5)
PLATELET # BLD AUTO: 419 K/UL — HIGH (ref 150–400)
PLATELET COUNT - ESTIMATE: NORMAL — SIGNIFICANT CHANGE UP
PMV BLD: 9.9 FL — SIGNIFICANT CHANGE UP (ref 7–13)
POIKILOCYTOSIS BLD QL AUTO: SIGNIFICANT CHANGE UP
POTASSIUM SERPL-MCNC: 3.7 MMOL/L — SIGNIFICANT CHANGE UP (ref 3.5–5.3)
POTASSIUM SERPL-SCNC: 3.7 MMOL/L — SIGNIFICANT CHANGE UP (ref 3.5–5.3)
PROMYELOCYTES # FLD: 0 % — SIGNIFICANT CHANGE UP (ref 0–0)
PROT SERPL-MCNC: 7.2 G/DL — SIGNIFICANT CHANGE UP (ref 6–8.3)
RBC # BLD: 5.13 M/UL — SIGNIFICANT CHANGE UP (ref 4.2–5.8)
RBC # FLD: 15.5 % — HIGH (ref 10.3–14.5)
SODIUM SERPL-SCNC: 139 MMOL/L — SIGNIFICANT CHANGE UP (ref 135–145)
TARGETS BLD QL SMEAR: SLIGHT — SIGNIFICANT CHANGE UP
VARIANT LYMPHS # BLD: 3.5 % — SIGNIFICANT CHANGE UP
WBC # BLD: 23.89 K/UL — HIGH (ref 3.8–10.5)
WBC # FLD AUTO: 23.89 K/UL — HIGH (ref 3.8–10.5)

## 2019-09-03 PROCEDURE — 99232 SBSQ HOSP IP/OBS MODERATE 35: CPT | Mod: GC

## 2019-09-03 PROCEDURE — 99233 SBSQ HOSP IP/OBS HIGH 50: CPT

## 2019-09-03 RX ORDER — ONDANSETRON 8 MG/1
4 TABLET, FILM COATED ORAL EVERY 6 HOURS
Refills: 0 | Status: DISCONTINUED | OUTPATIENT
Start: 2019-09-03 | End: 2019-09-07

## 2019-09-03 RX ORDER — OXYCODONE HYDROCHLORIDE 5 MG/1
15 TABLET ORAL EVERY 6 HOURS
Refills: 0 | Status: DISCONTINUED | OUTPATIENT
Start: 2019-09-03 | End: 2019-09-05

## 2019-09-03 RX ADMIN — ONDANSETRON 4 MILLIGRAM(S): 8 TABLET, FILM COATED ORAL at 17:04

## 2019-09-03 RX ADMIN — METHADONE HYDROCHLORIDE 2.5 MILLIGRAM(S): 40 TABLET ORAL at 17:04

## 2019-09-03 RX ADMIN — Medication 650 MILLIGRAM(S): at 12:08

## 2019-09-03 RX ADMIN — ENOXAPARIN SODIUM 40 MILLIGRAM(S): 100 INJECTION SUBCUTANEOUS at 06:21

## 2019-09-03 RX ADMIN — GABAPENTIN 300 MILLIGRAM(S): 400 CAPSULE ORAL at 17:05

## 2019-09-03 RX ADMIN — Medication 650 MILLIGRAM(S): at 11:08

## 2019-09-03 RX ADMIN — CHLORHEXIDINE GLUCONATE 1 APPLICATION(S): 213 SOLUTION TOPICAL at 06:21

## 2019-09-03 RX ADMIN — FLUCONAZOLE 100 MILLIGRAM(S): 150 TABLET ORAL at 15:26

## 2019-09-03 NOTE — PROGRESS NOTE ADULT - ASSESSMENT
53 y/o M w/ nasopharnygeal carcinoma (symptoms began 3/18, biopsy 10/18 revealing non-keratinizing, differentiated nasopharyngeal carcinoma s/p radiation with cisplatin followed by adjuvant cisplatin and 5FU from 11/18 to 5/19 at [facility], now established care with oncologist Dr. Leon, per oncology, tumor refractory/resistant to chemo/radiation, now receiving pembrolizumab (cycle 2 on 8/23/19), presented with 3-4 days of increased falls, patient denies head trauma, generalized weakness, and decreased PO intake. Patient currently reports headache, non-radiating, without any nausea, vomiting. No other recent change in symptoms per patient, denies fevers, chills, recent illnesses, difficulty breathing, cough, or chest pain. Palliative care consulted for GOC in the setting of advanced nasopharyngeal carcinoma refractory to chemo/radiation. 53 y/o M w/ nasopharnygeal carcinoma (symptoms began 3/18, biopsy 10/18 revealing non-keratinizing, differentiated nasopharyngeal carcinoma s/p radiation with cisplatin followed by adjuvant cisplatin and 5FU from 11/18 to 5/19 at [facility], now established care with oncologist Dr. Leon, per oncology, tumor refractory/resistant to chemo/radiation, now receiving pembrolizumab (cycle 2 on 8/23/19), presented with 3-4 days of increased falls, patient denies head trauma, generalized weakness, and decreased PO intake. Patient currently reports headache, non-radiating, without any nausea, vomiting. No other recent change in symptoms per patient, denies fevers, chills, recent illnesses, difficulty breathing, cough, or chest pain. Palliative care consulted for GOC in the setting of advanced nasopharyngeal carcinoma refractory to chemotx and immunotherapy.

## 2019-09-03 NOTE — PROGRESS NOTE ADULT - SUBJECTIVE AND OBJECTIVE BOX
INTERVAL HPI/OVERNIGHT EVENTS:  Patient S&E at bedside. Pt reports pain is better controlled today. Was able to sleep 10 hours last night. No fevers or chills. No nausea or vomiting.     MEDICATIONS  (STANDING):  chlorhexidine 4% Liquid 1 Application(s) Topical <User Schedule>  docusate sodium Liquid 100 milliGRAM(s) Oral two times a day  enoxaparin Injectable 40 milliGRAM(s) SubCutaneous every 24 hours  fluconAZOLE IVPB 200 milliGRAM(s) IV Intermittent every 24 hours  gabapentin   Solution 300 milliGRAM(s) Oral two times a day  levothyroxine 25 MICROGram(s) Oral daily  methadone   Solution 2.5 milliGRAM(s) Oral two times a day  senna 2 Tablet(s) Oral at bedtime    MEDICATIONS  (PRN):  acetaminophen    Suspension .. 650 milliGRAM(s) Oral every 6 hours PRN Mild Pain (1 - 3)  oxyCODONE    IR 20 milliGRAM(s) Oral every 4 hours PRN Pain      Allergies    No Known Drug Allergies  shellfish (Unknown)    Intolerances    Vital Signs Last 24 Hrs  T(C): 36.9 (03 Sep 2019 06:07), Max: 36.9 (03 Sep 2019 06:07)  T(F): 98.4 (03 Sep 2019 06:07), Max: 98.4 (03 Sep 2019 06:07)  HR: 98 (03 Sep 2019 06:07) (63 - 98)  BP: 103/76 (03 Sep 2019 06:07) (99/75 - 139/91)  BP(mean): --  RR: 18 (03 Sep 2019 06:07) (18 - 18)  SpO2: 100% (03 Sep 2019 06:07) (96% - 100%)    PHYSICAL EXAM:   GENERAL: sitting in bed comfortably  HEENT: EOMI  RESP: CTAB  HEART: RRR, S1/S2  ABDOMEN: +BS, soft, NT, ND  EXTREMITIES: no LE edema  NEUROLOGIC: AAO x 3, dysarthric and difficult to understand speech    LABS:                                   10.0   23.89 )-----------( 419      ( 03 Sep 2019 06:00 )             34.7   09-03    139  |  94<L>  |  20  ----------------------------<  138<H>  3.7   |  33<H>  |  1.01    Ca    9.9      03 Sep 2019 06:00  Phos  4.6     09-03  Mg     1.8     09-03    TPro  7.2  /  Alb  3.4  /  TBili  < 0.2<L>  /  DBili  x   /  AST  15  /  ALT  13  /  AlkPhos  69  09-03    RADIOLOGY & ADDITIONAL TESTS:  Studies reviewed. INTERVAL HPI/OVERNIGHT EVENTS:  Patient S&E at bedside. Pt reports pain is better controlled today. Was able to sleep 10 hours last night. No fevers or chills. No nausea or vomiting. Later walking in hallway with walker and family.    MEDICATIONS  (STANDING):  chlorhexidine 4% Liquid 1 Application(s) Topical <User Schedule>  docusate sodium Liquid 100 milliGRAM(s) Oral two times a day  enoxaparin Injectable 40 milliGRAM(s) SubCutaneous every 24 hours  fluconAZOLE IVPB 200 milliGRAM(s) IV Intermittent every 24 hours  gabapentin   Solution 300 milliGRAM(s) Oral two times a day  levothyroxine 25 MICROGram(s) Oral daily  methadone   Solution 2.5 milliGRAM(s) Oral two times a day  senna 2 Tablet(s) Oral at bedtime    MEDICATIONS  (PRN):  acetaminophen    Suspension .. 650 milliGRAM(s) Oral every 6 hours PRN Mild Pain (1 - 3)  oxyCODONE    IR 20 milliGRAM(s) Oral every 4 hours PRN Pain      Allergies    No Known Drug Allergies  shellfish (Unknown)    Intolerances    Vital Signs Last 24 Hrs  T(C): 36.9 (03 Sep 2019 06:07), Max: 36.9 (03 Sep 2019 06:07)  T(F): 98.4 (03 Sep 2019 06:07), Max: 98.4 (03 Sep 2019 06:07)  HR: 98 (03 Sep 2019 06:07) (63 - 98)  BP: 103/76 (03 Sep 2019 06:07) (99/75 - 139/91)  BP(mean): --  RR: 18 (03 Sep 2019 06:07) (18 - 18)  SpO2: 100% (03 Sep 2019 06:07) (96% - 100%)    PHYSICAL EXAM:   GENERAL: sitting in bed comfortably  HEENT: EOMI  RESP: CTAB  HEART: RRR, S1/S2  ABDOMEN: +BS, soft, NT, ND  EXTREMITIES: no LE edema  NEUROLOGIC: AAO x 3, dysarthric and difficult to understand speech    LABS:                                   10.0   23.89 )-----------( 419      ( 03 Sep 2019 06:00 )             34.7   09-03    139  |  94<L>  |  20  ----------------------------<  138<H>  3.7   |  33<H>  |  1.01    Ca    9.9      03 Sep 2019 06:00  Phos  4.6     09-03  Mg     1.8     09-03    TPro  7.2  /  Alb  3.4  /  TBili  < 0.2<L>  /  DBili  x   /  AST  15  /  ALT  13  /  AlkPhos  69  09-03    RADIOLOGY & ADDITIONAL TESTS:  Studies reviewed.

## 2019-09-03 NOTE — PROGRESS NOTE ADULT - PROBLEM SELECTOR PLAN 1
Most likely complex pain due to rapid tumor growth with nerve involvement. Patient reports improvement in pain 6/10 head/facial pain, pressure/aching sensation, associated with decrease in hearing and rhinorrhea. Patient required oxycodone 20 mg po x2 overnight.     -c/w Methadone 2.5 mg solution po bid  -decrease Oxycodone IR 20 mg to 15 mg po q4hr prn pain (due to s/e of drowsiness)  -c/w Gabapentin  -Will reassess daily requirement and adjust doses based on symptom burden  -c/w bowel regimen.

## 2019-09-03 NOTE — PROGRESS NOTE ADULT - ATTENDING COMMENTS
I discussed the case, saw the patient with the fellow and agree with the above.  Marcia Leon MD
The patient was seen and examined today with the fellow, Dr. Bullock, and I agree with the History, Physical Exam and Plan in the record. Labs and imaging reviewed by me.
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 MRI pending. Check AM cortisol level. Supportive care. Onc team will continue to follow closely with admitting team.

## 2019-09-03 NOTE — PROGRESS NOTE ADULT - SUBJECTIVE AND OBJECTIVE BOX
SUBJECTIVE AND OBJECTIVE:   INTERVAL HPI/OVERNIGHT EVENTS: No acute events overnight. Patient was seen and examined at bedside. Patient appears to be more comfortable with improved pain control. Pain is 6/10, left-sided pain radiating to neck and shoulder. Patient states he has better appetite and is able to ambulate to and from the bathroom. Patient required Oxycodone IR 20 mg x2 BTD in 24 hrs. However, patient reports if dose could be decreased due to s/e of increased sedation and prolonged time spent sleeping after receiving BTD. Patient reports nausea and several episodes of nonbloody, bilious vomiting.    DNR on chart:   Allergies    No Known Drug Allergies  shellfish (Unknown)    Intolerances    MEDICATIONS  (STANDING):  chlorhexidine 4% Liquid 1 Application(s) Topical <User Schedule>  docusate sodium Liquid 100 milliGRAM(s) Oral two times a day  enoxaparin Injectable 40 milliGRAM(s) SubCutaneous every 24 hours  fluconAZOLE IVPB 200 milliGRAM(s) IV Intermittent every 24 hours  gabapentin   Solution 300 milliGRAM(s) Oral two times a day  levothyroxine 25 MICROGram(s) Oral daily  methadone   Solution 2.5 milliGRAM(s) Oral two times a day  senna 2 Tablet(s) Oral at bedtime    MEDICATIONS  (PRN):  acetaminophen    Suspension .. 650 milliGRAM(s) Oral every 6 hours PRN Mild Pain (1 - 3)  ondansetron Injectable 4 milliGRAM(s) IV Push every 6 hours PRN Nausea and/or Vomiting  oxyCODONE    IR 15 milliGRAM(s) Oral every 6 hours PRN Severe Pain (7 - 10)      ITEMS UNCHECKED ARE NOT PRESENT    PRESENT SYMPTOMS: [ ]Unable to obtain due to poor mentation   Source if other than patient:  [ ]Family   [ ]Team     Pain:  [x] yes [ ] no  QOL impact - severe, unable to focus, debilitating   Location - left side of head mostly                   Aggravating factors - n/a  Quality - aching/stabbing  Radiation - towards neck and shoulders  Timing- constant, waxing waning  Severity (0-10 scale): 6/10  Minimal acceptable level (0-10 scale): 2    Dyspnea:                           [ ]Mild [ ]Moderate [ ]Severe  Anxiety:                             [x]Mild [ ]Moderate [ ]Severe  Fatigue:                             [x]Mild [ ]Moderate [ ]Severe  Nausea:                             [ ]Mild [x]Moderate [ ]Severe  Loss of appetite:              [ ]Mild [x]Moderate [ ]Severe  Constipation:                    [ ]Mild [ ]Moderate [ ]Severe    PAIN AD Score:	  [email or web address] (Ctrl + left click to view)    Other Symptoms:  [x]All other review of systems negative     Karnofsky Performance Score/Palliative Performance Status Version 2:  50-60%    [email or web address]  PHYSICAL EXAM:  Vital Signs Last 24 Hrs  T(C): 36.4 (03 Sep 2019 15:44), Max: 36.9 (03 Sep 2019 06:07)  T(F): 97.6 (03 Sep 2019 15:44), Max: 98.4 (03 Sep 2019 06:07)  HR: 94 (03 Sep 2019 15:44) (63 - 98)  BP: 125/84 (03 Sep 2019 15:44) (99/75 - 135/85)  BP(mean): --  RR: 18 (03 Sep 2019 15:44) (18 - 20)  SpO2: 96% (03 Sep 2019 15:44) (96% - 100%) I&O's Summary   GENERAL:  [x]Alert  [x]Oriented x3   [ ]Lethargic  [x]Cachexia  [ ]Unarousable  [x]Verbal  [ ]Non-Verbal  Behavioral:   [ ] Anxiety  [ ] Delirium [ ] Agitation [ ] Other  HEENT:  [ ]Normal   [x]Dry mouth   [ ]ET Tube/Trach  [x]Oral lesions  PULMONARY:   [x]Clear [ ]Tachypnea  [ ]Audible excessive secretions   [ ]Rhonchi        [ ]Right [ ]Left [ ]Bilateral  [ ]Crackles        [ ]Right [ ]Left [ ]Bilateral  [ ]Wheezing     [ ]Right [ ]Left [ ]Bilateral  CARDIOVASCULAR:    [x]Regular [ ]Irregular [ ]Tachy  [ ]Mikey [ ]Murmur [ ]Other  GASTROINTESTINAL:  [x]Soft  [ ]Distended   [x]+BS  [x]Non tender [ ]Tender  [ ]PEG [ ]OGT/ NGT    GENITOURINARY:  [x]Normal [ ] Incontinent   [ ]Oliguria/Anuria   [ ]Carrillo  MUSCULOSKELETAL: frail build  [x]Normal   [ ]Weakness  [ ]Bed/Wheelchair bound [ ]Edema  NEUROLOGIC:   [x]No focal deficits  [ ] Cognitive impairment  [x] Dysphagia 2/2 nasopharyngeal carcinoma [ ]Dysarthria [ ] Paresis [ ]Other   SKIN:   [x]Normal   [ ]Pressure ulcer(s)  [ ]Rash    CRITICAL CARE:  [ ] Shock Present  [ ]Septic [ ]Cardiogenic [ ]Neurologic [ ]Hypovolemic  [ ]  Vasopressors [ ]  Inotropes   [ ] Respiratory failure present [ ] Mechanical Ventilation [ ] Non-invasive ventilatory support [ ] High-Flow  [ ] Acute  [ ] Chronic [ ] Hypoxic  [ ] Hypercarbic [ ] Other  [ ] Other organ failure     LABS:                        10.0   23.89 )-----------( 419      ( 03 Sep 2019 06:00 )             34.7   09-03    139  |  94<L>  |  20  ----------------------------<  138<H>  3.7   |  33<H>  |  1.01    Ca    9.9      03 Sep 2019 06:00  Phos  4.6     09-03  Mg     1.8     09-03    TPro  7.2  /  Alb  3.4  /  TBili  < 0.2<L>  /  DBili  x   /  AST  15  /  ALT  13  /  AlkPhos  69  09-03        RADIOLOGY & ADDITIONAL STUDIES: < from: CT Head No Cont (08.27.19 @ 07:48) >  EXAM:  CT BRAIN        PROCEDURE DATE:  Aug 27 2019         INTERPRETATION:  Clinical indication: Altered mental status.    Multiple axial sections were performed from base of skull to vertex   without contrast enhancement. Coronal and sagittal reconstructions were   performed as well    There is evidence of extensive soft tissue prominence seen involving the   adenoid region. This is only partially demonstrated on this study.   Involvement of the bilateral sphenoid sinus is seen with destruction of   the posterior wall of the anterior and posterior sphenoid sinuses. There   is also opacification of both ethmoid sinuses seen left greater right and   complete opacification of the left frontal sinus. This finding is   suspicious for an underlying neoplastic process though the possibility of   underlying infection cannot be entirely excluded. Contrast enhanced CT   scan of the neck can be done to better evaluate the entire process.    Opacification of the left mastoid and middle ear regionis identified.    Parenchymal volume loss in proportion patient's age is seen.    Grossly, there is no evidence acute hemorrhage mass mass effect in the   posterior fossa or supratentorial region.    Impression: Extensive soft tissue mass in the adenoid region with   involvement of the sphenoid sinus and possibly the bilateral ethmoid and   left frontal sinuses.    Opacification of the left mastoid and middle ear regions seen as well.    Contrast enhanced CT scan of the soft tissue neck can be done for further   evaluation.    < end of copied text >      Protein Calorie Malnutrition Present: [x] yes [ ] no  [ ] PPSV2 < or = 30%  [x] significant weight loss [x] poor nutritional intake [ ] anasarca [ ] catabolic state Albumin, Serum: 3.4 g/dL (09-03-19 @ 06:00)  Artificial Nutrition [ ]     REFERRALS:   [ ]Chaplaincy  [x] Hospice  [ ]Child Life  [ ]Social Work  [x]Case management [ ]Holistic Therapy

## 2019-09-03 NOTE — PROGRESS NOTE ADULT - ASSESSMENT
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     1. FTT: likely 2/2 malignancy  - TSH normal  - on pureed diet  - PT, nutrition    2. Locally advanced nasopharyngeal carcinoma: refractory to treatment  - most recently on pembrolizumab, received C2 on 8/23  - MRI results reviewed with progressive disease  - c/w pain control, appreciate palliative f/u  - Had a family meeting on 8/30 and discussed refractory disease to treatments received. Discussed lack of good options for further treatment and focusing on symptom management. However, pt is not ready to discuss hospice and wants to continue with treatment. Will need ongoing discussions and palliative care following. No plans for any inpatient treatment.   - Pt and family requesting to see ENT to discuss possible debulking of tumor for symptom control. Reviewed that surgery may not be an option, however they would still like to see ENT.   - f/u as outpatient after d/c    d/w primary team

## 2019-09-03 NOTE — PROGRESS NOTE ADULT - PROBLEM SELECTOR PLAN 1
Likely due to progressive malignancy  - Nutrition and PT evaluation  - s/p cinesophagram recommending puree with Honey Thick Liquids, resume aspiration precautions  Advance care planning, meeting held on Friday, family and patient still unreceptive to the idea of hospice and continue to desire treatment, even if the risks outweigh the benefits.   Family and patient also request ENT consult to see if there is an option for tumor debulking

## 2019-09-03 NOTE — PROGRESS NOTE ADULT - SUBJECTIVE AND OBJECTIVE BOX
Patient is a 54y old  Male who presents with a chief complaint of falls x 3-4 days (02 Sep 2019 14:32)      SUBJECTIVE / OVERNIGHT EVENTS:  Patient seen and examined this morning with his brother in law at bedside. Patient reportedly feeling better and continues to request all treatment, even if the risks are outweighing the benefits. Denies fevers or chills.     MEDICATIONS  (STANDING):  chlorhexidine 4% Liquid 1 Application(s) Topical <User Schedule>  docusate sodium Liquid 100 milliGRAM(s) Oral two times a day  enoxaparin Injectable 40 milliGRAM(s) SubCutaneous every 24 hours  fluconAZOLE IVPB 200 milliGRAM(s) IV Intermittent every 24 hours  gabapentin   Solution 300 milliGRAM(s) Oral two times a day  levothyroxine 25 MICROGram(s) Oral daily  methadone   Solution 2.5 milliGRAM(s) Oral two times a day  senna 2 Tablet(s) Oral at bedtime    MEDICATIONS  (PRN):  acetaminophen    Suspension .. 650 milliGRAM(s) Oral every 6 hours PRN Mild Pain (1 - 3)  oxyCODONE    IR 20 milliGRAM(s) Oral every 4 hours PRN Pain      PHYSICAL EXAM:  T(C): 36.9 (09-03-19 @ 06:07), Max: 36.9 (09-03-19 @ 06:07)  HR: 98 (09-03-19 @ 06:07) (63 - 98)  BP: 103/76 (09-03-19 @ 06:07) (99/75 - 139/91)  RR: 18 (09-03-19 @ 06:07) (18 - 18)  SpO2: 100% (09-03-19 @ 06:07) (96% - 100%)  I&O's Summary    GENERAL: thin, NAD, pleasant  EYES:conjunctiva and sclera clear  NECK: trach, no secretions noted around  CHEST/LUNG: Clear to auscultation bilaterally; No wheeze  HEART: Regular rate and rhythm; No murmurs, rubs, or gallops  ABDOMEN: Soft, Nontender, Nondistended; Bowel sounds present  EXTREMITIES:  2+ Peripheral Pulses, No clubbing, cyanosis, or edema  PSYCH: AAOx3      LABS:  CAPILLARY BLOOD GLUCOSE                              10.0   23.89 )-----------( 419      ( 03 Sep 2019 06:00 )             34.7     09-03    139  |  94<L>  |  20  ----------------------------<  138<H>  3.7   |  33<H>  |  1.01    Ca    9.9      03 Sep 2019 06:00  Phos  4.6     09-03  Mg     1.8     09-03    TPro  7.2  /  Alb  3.4  /  TBili  < 0.2<L>  /  DBili  x   /  AST  15  /  ALT  13  /  AlkPhos  69  09-03              RADIOLOGY & ADDITIONAL TESTS:    Imaging Personally Reviewed:  Facial MRI  IMPRESSION:  Imaging significantly limited by patient motion.  Transcranial and skull base disease appears grossly unchanged.  Increased abnormal soft tissue within the nasopharynx. Slightly increased   abnormal soft tissue involving the left carotid space is suspected.  Consultant(s) Notes Reviewed:      Care Discussed with Consultants/Other Providers: Patient care discussed with oncology and palliative care during interdisciplinary rounds, case management, nursing management  and social work were also present. Case again discussed with oncology separately after IDRs, awaiting callback from ENT.

## 2019-09-04 ENCOUNTER — APPOINTMENT (OUTPATIENT)
Dept: HEMATOLOGY ONCOLOGY | Facility: CLINIC | Age: 54
End: 2019-09-04

## 2019-09-04 LAB
ANION GAP SERPL CALC-SCNC: 15 MMO/L — HIGH (ref 7–14)
BASOPHILS # BLD AUTO: 0.07 K/UL — SIGNIFICANT CHANGE UP (ref 0–0.2)
BASOPHILS NFR BLD AUTO: 0.3 % — SIGNIFICANT CHANGE UP (ref 0–2)
BUN SERPL-MCNC: 19 MG/DL — SIGNIFICANT CHANGE UP (ref 7–23)
CALCIUM SERPL-MCNC: 9.5 MG/DL — SIGNIFICANT CHANGE UP (ref 8.4–10.5)
CHLORIDE SERPL-SCNC: 94 MMOL/L — LOW (ref 98–107)
CO2 SERPL-SCNC: 30 MMOL/L — SIGNIFICANT CHANGE UP (ref 22–31)
CREAT SERPL-MCNC: 1.05 MG/DL — SIGNIFICANT CHANGE UP (ref 0.5–1.3)
EOSINOPHIL # BLD AUTO: 0.06 K/UL — SIGNIFICANT CHANGE UP (ref 0–0.5)
EOSINOPHIL NFR BLD AUTO: 0.3 % — SIGNIFICANT CHANGE UP (ref 0–6)
GLUCOSE SERPL-MCNC: 120 MG/DL — HIGH (ref 70–99)
HCT VFR BLD CALC: 30.4 % — LOW (ref 39–50)
HGB BLD-MCNC: 8.8 G/DL — LOW (ref 13–17)
IMM GRANULOCYTES NFR BLD AUTO: 0.8 % — SIGNIFICANT CHANGE UP (ref 0–1.5)
LYMPHOCYTES # BLD AUTO: 0.36 K/UL — LOW (ref 1–3.3)
LYMPHOCYTES # BLD AUTO: 1.7 % — LOW (ref 13–44)
MAGNESIUM SERPL-MCNC: 1.7 MG/DL — SIGNIFICANT CHANGE UP (ref 1.6–2.6)
MANUAL SMEAR VERIFICATION: SIGNIFICANT CHANGE UP
MCHC RBC-ENTMCNC: 19.3 PG — LOW (ref 27–34)
MCHC RBC-ENTMCNC: 28.9 % — LOW (ref 32–36)
MCV RBC AUTO: 66.8 FL — LOW (ref 80–100)
MONOCYTES # BLD AUTO: 0.63 K/UL — SIGNIFICANT CHANGE UP (ref 0–0.9)
MONOCYTES NFR BLD AUTO: 3.1 % — SIGNIFICANT CHANGE UP (ref 2–14)
NEUTROPHILS # BLD AUTO: 19.32 K/UL — HIGH (ref 1.8–7.4)
NEUTROPHILS NFR BLD AUTO: 93.8 % — HIGH (ref 43–77)
NRBC # FLD: 0 K/UL — SIGNIFICANT CHANGE UP (ref 0–0)
PHOSPHATE SERPL-MCNC: 3.5 MG/DL — SIGNIFICANT CHANGE UP (ref 2.5–4.5)
PLATELET # BLD AUTO: 396 K/UL — SIGNIFICANT CHANGE UP (ref 150–400)
PMV BLD: 10.5 FL — SIGNIFICANT CHANGE UP (ref 7–13)
POTASSIUM SERPL-MCNC: 3.7 MMOL/L — SIGNIFICANT CHANGE UP (ref 3.5–5.3)
POTASSIUM SERPL-SCNC: 3.7 MMOL/L — SIGNIFICANT CHANGE UP (ref 3.5–5.3)
RBC # BLD: 4.55 M/UL — SIGNIFICANT CHANGE UP (ref 4.2–5.8)
RBC # FLD: 15.5 % — HIGH (ref 10.3–14.5)
SODIUM SERPL-SCNC: 139 MMOL/L — SIGNIFICANT CHANGE UP (ref 135–145)
WBC # BLD: 20.61 K/UL — HIGH (ref 3.8–10.5)
WBC # FLD AUTO: 20.61 K/UL — HIGH (ref 3.8–10.5)

## 2019-09-04 PROCEDURE — 99233 SBSQ HOSP IP/OBS HIGH 50: CPT

## 2019-09-04 RX ORDER — INFLUENZA VIRUS VACCINE 15; 15; 15; 15 UG/.5ML; UG/.5ML; UG/.5ML; UG/.5ML
0.5 SUSPENSION INTRAMUSCULAR ONCE
Refills: 0 | Status: COMPLETED | OUTPATIENT
Start: 2019-09-04 | End: 2019-09-04

## 2019-09-04 RX ADMIN — Medication 25 MICROGRAM(S): at 07:01

## 2019-09-04 RX ADMIN — CHLORHEXIDINE GLUCONATE 1 APPLICATION(S): 213 SOLUTION TOPICAL at 07:00

## 2019-09-04 RX ADMIN — GABAPENTIN 300 MILLIGRAM(S): 400 CAPSULE ORAL at 18:49

## 2019-09-04 RX ADMIN — OXYCODONE HYDROCHLORIDE 15 MILLIGRAM(S): 5 TABLET ORAL at 02:32

## 2019-09-04 RX ADMIN — METHADONE HYDROCHLORIDE 2.5 MILLIGRAM(S): 40 TABLET ORAL at 09:06

## 2019-09-04 RX ADMIN — ENOXAPARIN SODIUM 40 MILLIGRAM(S): 100 INJECTION SUBCUTANEOUS at 06:57

## 2019-09-04 RX ADMIN — Medication 650 MILLIGRAM(S): at 12:00

## 2019-09-04 RX ADMIN — Medication 650 MILLIGRAM(S): at 11:03

## 2019-09-04 RX ADMIN — FLUCONAZOLE 100 MILLIGRAM(S): 150 TABLET ORAL at 15:35

## 2019-09-04 RX ADMIN — METHADONE HYDROCHLORIDE 2.5 MILLIGRAM(S): 40 TABLET ORAL at 18:49

## 2019-09-04 RX ADMIN — GABAPENTIN 300 MILLIGRAM(S): 400 CAPSULE ORAL at 06:57

## 2019-09-04 RX ADMIN — OXYCODONE HYDROCHLORIDE 15 MILLIGRAM(S): 5 TABLET ORAL at 03:32

## 2019-09-04 NOTE — PROGRESS NOTE ADULT - PROBLEM SELECTOR PLAN 1
Likely due to progressive malignancy  - Nutrition and PT evaluation  - s/p cinesophagram recommending puree with Honey Thick Liquids, resume aspiration precautions  Advance care planning, meeting held on Friday (Aug 30th), family and patient were not unreceptive to the idea of hospice and continued to desire treatment, even if the risks outweigh the benefits.   9/3- Family and patient also request ENT consult to see if there is an option for tumor debulking. Case d/w the Dr. Hawley, any debulking would likely lead to significant bleeding and therefore this option is not offered to the patient. Family meeting planned with hospice on 9/4 (today).

## 2019-09-04 NOTE — CHART NOTE - NSCHARTNOTEFT_GEN_A_CORE
Patient's symptoms are well controlled. Please see Goals of Care documentation. Awaiting Hospice evaluation.     Bette Shetty MD PGY4  Palliative Care Fellow  Pager # [phone]

## 2019-09-04 NOTE — GOALS OF CARE CONVERSATION - PERSONAL ADVANCE DIRECTIVE - CONVERSATION DETAILS
[facility]    [facility] RN met with pt and family.    Pt signed consents.    DME: walker, wheelchair, commode were ordered for delivery on Thurs 9/5/19.
Referral to palliative care for goals of care and symptom management. Extensive discussion held with Mr. Smith regarding his overall medical condition including summary of pt's cancer history, treatment, current hospitalization, and transition of care options. Pt and family understand that pt's cancer has progressed despite disease modifying treatment. Pt states that he wants stronger chemotherapy feeling that his cancer has progressed rapidly. Pt and family have discussed his disease progression and future tx options with primary oncologist. Pt is aware that treatment would not cure his cancer however is hopeful that further tx would give him more time. Pt and family are also aware that pt would need to follow up with oncologist as an out-pt and would need to have good performance status in order to receive continued tx. Pt and family requested eval from ENT for any possible surgical interventions. Pt was assessed. No surgical options/interventions indicated.   Discussed with pt and family the recommendation for hospice care. Provided in depth information about the services provided and the philosophy of care. Explained that the goal of hospice was to promote pt's quality of life and extend pt's time with dignity. Pt and family are amenable to discussing hospice care further and were agreeable to a referral to hospice care network. Meeting scheduled for 4pm today with [facility].  Advanced care planning discussed with pt who defers completion of MOLST and HCP at this time. Pt states that he wants to be in charge of his medical decision and does not want to appoint a HCP at this time.

## 2019-09-04 NOTE — GOALS OF CARE CONVERSATION - PERSONAL ADVANCE DIRECTIVE - NS PRO AD NO ADVANCE DIRECTIVE
No/Patient has not decided upon his choice for his HCP. He wants more time to consider and states he wants all his family to be present at family meeting to discuss next steps
No/Patient has not decided upon his choice for his HCP. He wants more time to consider and states he wants all his family to be present at family meeting to discuss next steps

## 2019-09-04 NOTE — PROGRESS NOTE ADULT - SUBJECTIVE AND OBJECTIVE BOX
Patient is a 54y old  Male who presents with a chief complaint of falls x 3-4 days (04 Sep 2019 12:48)      SUBJECTIVE / OVERNIGHT EVENTS:  Patient seen and examined this morning with his son in law at bedside. Patient states that he was able to sleep last night, he continues to desire to seek further care but is also open to meet with hospice.     MEDICATIONS  (STANDING):  chlorhexidine 4% Liquid 1 Application(s) Topical <User Schedule>  docusate sodium Liquid 100 milliGRAM(s) Oral two times a day  enoxaparin Injectable 40 milliGRAM(s) SubCutaneous every 24 hours  fluconAZOLE IVPB 200 milliGRAM(s) IV Intermittent every 24 hours  gabapentin   Solution 300 milliGRAM(s) Oral two times a day  levothyroxine 25 MICROGram(s) Oral daily  methadone   Solution 2.5 milliGRAM(s) Oral two times a day  senna 2 Tablet(s) Oral at bedtime    MEDICATIONS  (PRN):  acetaminophen    Suspension .. 650 milliGRAM(s) Oral every 6 hours PRN Mild Pain (1 - 3)  ondansetron Injectable 4 milliGRAM(s) IV Push every 6 hours PRN Nausea and/or Vomiting  oxyCODONE    IR 15 milliGRAM(s) Oral every 6 hours PRN Severe Pain (7 - 10)      PHYSICAL EXAM:  T(C): 36.3 (09-04-19 @ 13:18), Max: 36.6 (09-04-19 @ 06:55)  HR: 89 (09-04-19 @ 13:18) (75 - 100)  BP: 103/62 (09-04-19 @ 13:18) (102/72 - 126/78)  RR: 19 (09-04-19 @ 06:55) (18 - 19)  SpO2: 96% (09-04-19 @ 13:18) (96% - 98%)  I&O's Summary    GENERAL: thin, NAD, pleasant, difficult to understand  EYES: conjunctiva and sclera clear  NECK: trach, no secretions noted around  CHEST/LUNG: Clear to auscultation bilaterally; No wheeze  HEART: Regular rate and rhythm; No murmurs, rubs, or gallops  ABDOMEN: Soft, Nontender, Nondistended; Bowel sounds present  EXTREMITIES:  2+ Peripheral Pulses, No clubbing, cyanosis, or edema  PSYCH: AAOx3    LABS:  CAPILLARY BLOOD GLUCOSE                              8.8    20.61 )-----------( 396      ( 04 Sep 2019 05:15 )             30.4     09-04    139  |  94<L>  |  19  ----------------------------<  120<H>  3.7   |  30  |  1.05    Ca    9.5      04 Sep 2019 05:15  Phos  3.5     09-04  Mg     1.7     09-04    TPro  7.2  /  Alb  3.4  /  TBili  < 0.2<L>  /  DBili  x   /  AST  15  /  ALT  13  /  AlkPhos  69  09-03              RADIOLOGY & ADDITIONAL TESTS:    Imaging Personally Reviewed:    Consultant(s) Notes Reviewed:      Care Discussed with Consultants/Other Providers: Patient care discussed with oncology and palliative care during interdisciplinary rounds, case management, nursing management  and social work were also present.

## 2019-09-04 NOTE — CONSULT NOTE ADULT - CONSULT REASON
GOC in the setting of nasopharyngeal ca
nasopharyngeal ca, pt of Dr. Leon at [facility]
Surgery consult for nasopharyngeal cancer

## 2019-09-04 NOTE — GOALS OF CARE CONVERSATION - PERSONAL ADVANCE DIRECTIVE - TREATMENT GUIDELINE COMMENT
Pt hopeful for further DMT however aware of benefit and services of hospice care if DMT not a viable option.

## 2019-09-04 NOTE — PROGRESS NOTE ADULT - SUBJECTIVE AND OBJECTIVE BOX
SUBJECTIVE AND OBJECTIVE:  INTERVAL HPI/OVERNIGHT EVENTS:    DNR on chart:   Allergies    No Known Drug Allergies  shellfish (Unknown)    Intolerances    MEDICATIONS  (STANDING):  chlorhexidine 4% Liquid 1 Application(s) Topical <User Schedule>  docusate sodium Liquid 100 milliGRAM(s) Oral two times a day  enoxaparin Injectable 40 milliGRAM(s) SubCutaneous every 24 hours  fluconAZOLE IVPB 200 milliGRAM(s) IV Intermittent every 24 hours  gabapentin   Solution 300 milliGRAM(s) Oral two times a day  levothyroxine 25 MICROGram(s) Oral daily  methadone   Solution 2.5 milliGRAM(s) Oral two times a day  senna 2 Tablet(s) Oral at bedtime    MEDICATIONS  (PRN):  acetaminophen    Suspension .. 650 milliGRAM(s) Oral every 6 hours PRN Mild Pain (1 - 3)  ondansetron Injectable 4 milliGRAM(s) IV Push every 6 hours PRN Nausea and/or Vomiting  oxyCODONE    IR 15 milliGRAM(s) Oral every 6 hours PRN Severe Pain (7 - 10)      ITEMS UNCHECKED ARE NOT PRESENT    PRESENT SYMPTOMS: [ ]Unable to obtain due to poor mentation   Source if other than patient:  [ ]Family   [ ]Team     Pain:  [ ] yes [ ] no  QOL impact -   Location -                    Aggravating factors -  Quality -  Radiation -  Timing-  Severity (0-10 scale):  Minimal acceptable level (0-10 scale):     Dyspnea:                           [ ]Mild [ ]Moderate [ ]Severe  Anxiety:                             [ ]Mild [ ]Moderate [ ]Severe  Fatigue:                             [ ]Mild [ ]Moderate [ ]Severe  Nausea:                             [ ]Mild [ ]Moderate [ ]Severe  Loss of appetite:              [ ]Mild [ ]Moderate [ ]Severe  Constipation:                    [ ]Mild [ ]Moderate [ ]Severe    PAIN AD Score:	  [email or web address] (Ctrl + left click to view)    Other Symptoms:  [ ]All other review of systems negative     Karnofsky Performance Score/Palliative Performance Status Version 2:         %    http://npcrc.org/files/news/palliative_performance_scale_ppsv2.pdf  [email or web address].pdf  PHYSICAL EXAM:  Vital Signs Last 24 Hrs  T(C): 36.6 (04 Sep 2019 06:55), Max: 36.6 (04 Sep 2019 06:55)  T(F): 97.9 (04 Sep 2019 06:55), Max: 97.9 (04 Sep 2019 06:55)  HR: 100 (04 Sep 2019 06:55) (75 - 100)  BP: 102/72 (04 Sep 2019 06:55) (102/72 - 126/78)  BP(mean): --  RR: 19 (04 Sep 2019 06:55) (18 - 19)  SpO2: 97% (04 Sep 2019 06:55) (96% - 98%) I&O's Summary   GENERAL:  [ ]Alert  [ ]Oriented x   [ ]Lethargic  [ ]Cachexia  [ ]Unarousable  [ ]Verbal  [ ]Non-Verbal  Behavioral:   [ ] Anxiety  [ ] Delirium [ ] Agitation [ ] Other  HEENT:  [ ]Normal   [ ]Dry mouth   [ ]ET Tube/Trach  [ ]Oral lesions  PULMONARY:   [ ]Clear [ ]Tachypnea  [ ]Audible excessive secretions   [ ]Rhonchi        [ ]Right [ ]Left [ ]Bilateral  [ ]Crackles        [ ]Right [ ]Left [ ]Bilateral  [ ]Wheezing     [ ]Right [ ]Left [ ]Bilateral  CARDIOVASCULAR:    [ ]Regular [ ]Irregular [ ]Tachy  [ ]Mikey [ ]Murmur [ ]Other  GASTROINTESTINAL:  [ ]Soft  [ ]Distended   [ ]+BS  [ ]Non tender [ ]Tender  [ ]PEG [ ]OGT/ NGT   Last BM:    GENITOURINARY:  [ ]Normal [ ] Incontinent   [ ]Oliguria/Anuria   [ ]Carrillo  MUSCULOSKELETAL:   [ ]Normal   [ ]Weakness  [ ]Bed/Wheelchair bound [ ]Edema  NEUROLOGIC:   [ ]No focal deficits  [ ] Cognitive impairment  [ ] Dysphagia [ ]Dysarthria [ ] Paresis [ ]Other   SKIN:   [ ]Normal   [ ]Pressure ulcer(s)  [ ]Rash    CRITICAL CARE:  [ ] Shock Present  [ ]Septic [ ]Cardiogenic [ ]Neurologic [ ]Hypovolemic  [ ]  Vasopressors [ ]  Inotropes   [ ] Respiratory failure present [ ] Mechanical Ventilation [ ] Non-invasive ventilatory support [ ] High-Flow  [ ] Acute  [ ] Chronic [ ] Hypoxic  [ ] Hypercarbic [ ] Other  [ ] Other organ failure     LABS:                        8.8    20.61 )-----------( 396      ( 04 Sep 2019 05:15 )             30.4   09-04    139  |  94<L>  |  19  ----------------------------<  120<H>  3.7   |  30  |  1.05    Ca    9.5      04 Sep 2019 05:15  Phos  3.5     09-04  Mg     1.7     09-04    TPro  7.2  /  Alb  3.4  /  TBili  < 0.2<L>  /  DBili  x   /  AST  15  /  ALT  13  /  AlkPhos  69  09-03        RADIOLOGY & ADDITIONAL STUDIES:    Protein Calorie Malnutrition Present: [ ] yes [ ] no  [ ] PPSV2 < or = 30%  [ ] significant weight loss [ ] poor nutritional intake [ ] anasarca [ ] catabolic state Albumin, Serum: 3.4 g/dL (09-03-19 @ 06:00)  Artificial Nutrition [ ]     REFERRALS:   [ ]Chaplaincy  [ ] Hospice  [ ]Child Life  [ ]Social Work  [ ]Case management [ ]Holistic Therapy     Goals of Care Document:  JOSE Sharma (09-04-19 @ 12:19)  Goals of Care Conversation:   Participants:  · Participants  Patient  · Relative  Abiodun Bentley  · Provider  Dr. Shetty  ·   Quin Sharma    Advance Directives:  · Does patient have Advance Directive  No  · Do you want to complete the HCP and name a Manav Care Agent  No  Patient has not decided upon his choice for his HCP. He wants more time to consider and states he wants all his family to be present at family meeting to discuss next steps  · Caregiver:  no    Conversation Discussion:  · Conversation  Diagnosis; Prognosis; MOLST Discussed; Treatment Options; Hospice Referral  · Conversation Details  Referral to palliative care for goals of care and symptom management. Extensive discussion held with Mr. Smith regarding his overall medical condition including summary of pt's cancer history, treatment, current hospitalization, and transition of care options. Pt and family understand that pt's cancer has progressed despite disease modifying treatment. Pt states that he wants stronger chemotherapy feeling that his cancer has progressed rapidly. Pt and family have discussed his disease progression and future tx options with primary oncologist. Pt is aware that treatment would not cure his cancer however is hopeful that further tx would give him more time. Pt and family are also aware that pt would need to follow up with oncologist as an out-pt and would need to have good performance status in order to receive continued tx. Pt and family requested eval from ENT for any possible surgical interventions. Pt was assessed. No surgical options/interventions indicated.   Discussed with pt and family the recommendation for hospice care. Provided in depth information about the services provided and the philosophy of care. Explained that the goal of hospice was to promote pt's quality of life and extend pt's time with dignity. Pt and family are amenable to discussing hospice care further and were agreeable to a referral to hospice care network. Meeting scheduled for 4pm today with HCN.  Advanced care planning discussed with pt who defers completion of MOLST and HCP at this time. Pt states that he wants to be in charge of his medical decision and does not want to appoint a HCP at this time.    What Matters Most To Patient and Family:  · What matters most to patient and family  Pt wants to continue to fight his cancer and maximize his quality and quantity of life.    Personal Advance Directives Treatment Guidelines:   Treatment Guidelines:  · Decision Maker  Patient  · Treatment Guideline Comments  Pt hopeful for further DMT however aware of benefit and services of hospice care if DMT not a viable option.      [place] Signatures:  Quin Sharma [place] [place]  (Signed 04-Sep-2019 12:42)  	Authored: Goals of Care Conversation, Personal Advance Directives Treatment Guidelines      Last Updated: 04-Sep-2019 12:42 by Quin Sharma [place] [place]       Goals of Care Conversation - Personal Advance Directive [JOSE Sharma] (09-04-19 @ 12:19)

## 2019-09-04 NOTE — CONSULT NOTE ADULT - SUBJECTIVE AND OBJECTIVE BOX
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 Patient recently seen in clinic by Dr. Monae. ENT consulted by request of family for evaluating surgical options for care. Patient reports nasal obstruction, symptoms recently noted on clinic visit with Dr. Monae in August.     Vital Signs Last 24 Hrs  T(C): 36.5 (03 Sep 2019 22:06), Max: 36.9 (03 Sep 2019 06:07)  T(F): 97.7 (03 Sep 2019 22:06), Max: 98.4 (03 Sep 2019 06:07)  HR: 75 (03 Sep 2019 22:06) (75 - 98)  BP: 126/78 (03 Sep 2019 22:06) (100/76 - 126/78)  BP(mean): --  RR: 19 (03 Sep 2019 22:06) (18 - 20)  SpO2: 98% (03 Sep 2019 22:06) (96% - 100%)    NAD, breathing comfortably on room air  CN intact  OC/OP: minimal clear secretions  Neck: soft, flat    Assessment/Plan:     54 yoM with advanced nasopharyngeal cancer, ENT consulted to see if any role for surgical treatment.    -No surgical intervention. Cancer is unresectable and no option for palliative/debulking surgery given risk of bleeding and lack of overall benefit to quality of life  -Continue care per medicine/medical oncology  -Case discussed with Dr. Monae

## 2019-09-05 LAB
ANION GAP SERPL CALC-SCNC: 13 MMO/L — SIGNIFICANT CHANGE UP (ref 7–14)
BASOPHILS # BLD AUTO: 0.05 K/UL — SIGNIFICANT CHANGE UP (ref 0–0.2)
BASOPHILS NFR BLD AUTO: 0.3 % — SIGNIFICANT CHANGE UP (ref 0–2)
BUN SERPL-MCNC: 20 MG/DL — SIGNIFICANT CHANGE UP (ref 7–23)
CALCIUM SERPL-MCNC: 9.7 MG/DL — SIGNIFICANT CHANGE UP (ref 8.4–10.5)
CHLORIDE SERPL-SCNC: 92 MMOL/L — LOW (ref 98–107)
CO2 SERPL-SCNC: 32 MMOL/L — HIGH (ref 22–31)
CREAT SERPL-MCNC: 1.06 MG/DL — SIGNIFICANT CHANGE UP (ref 0.5–1.3)
EOSINOPHIL # BLD AUTO: 0.15 K/UL — SIGNIFICANT CHANGE UP (ref 0–0.5)
EOSINOPHIL NFR BLD AUTO: 0.9 % — SIGNIFICANT CHANGE UP (ref 0–6)
GLUCOSE SERPL-MCNC: 96 MG/DL — SIGNIFICANT CHANGE UP (ref 70–99)
HCT VFR BLD CALC: 30.7 % — LOW (ref 39–50)
HGB BLD-MCNC: 8.9 G/DL — LOW (ref 13–17)
IMM GRANULOCYTES NFR BLD AUTO: 0.7 % — SIGNIFICANT CHANGE UP (ref 0–1.5)
LYMPHOCYTES # BLD AUTO: 0.8 K/UL — LOW (ref 1–3.3)
LYMPHOCYTES # BLD AUTO: 4.6 % — LOW (ref 13–44)
MCHC RBC-ENTMCNC: 19.3 PG — LOW (ref 27–34)
MCHC RBC-ENTMCNC: 29 % — LOW (ref 32–36)
MCV RBC AUTO: 66.6 FL — LOW (ref 80–100)
MONOCYTES # BLD AUTO: 0.97 K/UL — HIGH (ref 0–0.9)
MONOCYTES NFR BLD AUTO: 5.6 % — SIGNIFICANT CHANGE UP (ref 2–14)
NEUTROPHILS # BLD AUTO: 15.25 K/UL — HIGH (ref 1.8–7.4)
NEUTROPHILS NFR BLD AUTO: 87.9 % — HIGH (ref 43–77)
NRBC # FLD: 0 K/UL — SIGNIFICANT CHANGE UP (ref 0–0)
PLATELET # BLD AUTO: 394 K/UL — SIGNIFICANT CHANGE UP (ref 150–400)
PMV BLD: 9.6 FL — SIGNIFICANT CHANGE UP (ref 7–13)
POTASSIUM SERPL-MCNC: 4.3 MMOL/L — SIGNIFICANT CHANGE UP (ref 3.5–5.3)
POTASSIUM SERPL-SCNC: 4.3 MMOL/L — SIGNIFICANT CHANGE UP (ref 3.5–5.3)
RBC # BLD: 4.61 M/UL — SIGNIFICANT CHANGE UP (ref 4.2–5.8)
RBC # FLD: 15.7 % — HIGH (ref 10.3–14.5)
SODIUM SERPL-SCNC: 137 MMOL/L — SIGNIFICANT CHANGE UP (ref 135–145)
WBC # BLD: 17.35 K/UL — HIGH (ref 3.8–10.5)
WBC # FLD AUTO: 17.35 K/UL — HIGH (ref 3.8–10.5)

## 2019-09-05 PROCEDURE — 99233 SBSQ HOSP IP/OBS HIGH 50: CPT

## 2019-09-05 RX ORDER — METHADONE HYDROCHLORIDE 40 MG/1
2.5 TABLET ORAL
Refills: 0 | Status: DISCONTINUED | OUTPATIENT
Start: 2019-09-05 | End: 2019-09-07

## 2019-09-05 RX ORDER — LORATADINE 10 MG/1
10 TABLET ORAL DAILY
Refills: 0 | Status: DISCONTINUED | OUTPATIENT
Start: 2019-09-05 | End: 2019-09-07

## 2019-09-05 RX ORDER — FLUCONAZOLE 150 MG/1
200 TABLET ORAL DAILY
Refills: 0 | Status: DISCONTINUED | OUTPATIENT
Start: 2019-09-05 | End: 2019-09-07

## 2019-09-05 RX ORDER — OXYCODONE HYDROCHLORIDE 5 MG/1
15 TABLET ORAL EVERY 6 HOURS
Refills: 0 | Status: DISCONTINUED | OUTPATIENT
Start: 2019-09-05 | End: 2019-09-07

## 2019-09-05 RX ADMIN — LORATADINE 10 MILLIGRAM(S): 10 TABLET ORAL at 22:14

## 2019-09-05 RX ADMIN — Medication 650 MILLIGRAM(S): at 13:50

## 2019-09-05 RX ADMIN — METHADONE HYDROCHLORIDE 2.5 MILLIGRAM(S): 40 TABLET ORAL at 05:39

## 2019-09-05 RX ADMIN — OXYCODONE HYDROCHLORIDE 15 MILLIGRAM(S): 5 TABLET ORAL at 23:27

## 2019-09-05 RX ADMIN — CHLORHEXIDINE GLUCONATE 1 APPLICATION(S): 213 SOLUTION TOPICAL at 05:41

## 2019-09-05 RX ADMIN — OXYCODONE HYDROCHLORIDE 15 MILLIGRAM(S): 5 TABLET ORAL at 06:40

## 2019-09-05 RX ADMIN — Medication 650 MILLIGRAM(S): at 12:59

## 2019-09-05 RX ADMIN — METHADONE HYDROCHLORIDE 2.5 MILLIGRAM(S): 40 TABLET ORAL at 17:26

## 2019-09-05 RX ADMIN — FLUCONAZOLE 200 MILLIGRAM(S): 150 TABLET ORAL at 17:27

## 2019-09-05 RX ADMIN — ENOXAPARIN SODIUM 40 MILLIGRAM(S): 100 INJECTION SUBCUTANEOUS at 05:40

## 2019-09-05 RX ADMIN — GABAPENTIN 300 MILLIGRAM(S): 400 CAPSULE ORAL at 05:40

## 2019-09-05 RX ADMIN — Medication 25 MICROGRAM(S): at 05:40

## 2019-09-05 RX ADMIN — GABAPENTIN 300 MILLIGRAM(S): 400 CAPSULE ORAL at 17:26

## 2019-09-05 RX ADMIN — OXYCODONE HYDROCHLORIDE 15 MILLIGRAM(S): 5 TABLET ORAL at 05:43

## 2019-09-05 NOTE — PROGRESS NOTE ADULT - SUBJECTIVE AND OBJECTIVE BOX
Patient is a 54y old  Male who presents with a chief complaint of falls x 3-4 days (04 Sep 2019 14:37)      SUBJECTIVE / OVERNIGHT EVENTS:  Patient seen and examined this morning. Hard to understand his speech and appears frustrated by his prognosis and the inability to treat his symptoms better. No fevers or chills.    MEDICATIONS  (STANDING):  chlorhexidine 4% Liquid 1 Application(s) Topical <User Schedule>  docusate sodium Liquid 100 milliGRAM(s) Oral two times a day  enoxaparin Injectable 40 milliGRAM(s) SubCutaneous every 24 hours  gabapentin   Solution 300 milliGRAM(s) Oral two times a day  influenza   Vaccine 0.5 milliLiter(s) IntraMuscular once  levothyroxine 25 MICROGram(s) Oral daily  methadone   Solution 2.5 milliGRAM(s) Oral two times a day  senna 2 Tablet(s) Oral at bedtime    MEDICATIONS  (PRN):  acetaminophen    Suspension .. 650 milliGRAM(s) Oral every 6 hours PRN Mild Pain (1 - 3)  ondansetron Injectable 4 milliGRAM(s) IV Push every 6 hours PRN Nausea and/or Vomiting  oxyCODONE    IR 15 milliGRAM(s) Oral every 6 hours PRN Severe Pain (7 - 10)      PHYSICAL EXAM:  T(C): 36.7 (09-05-19 @ 05:27), Max: 36.7 (09-05-19 @ 05:27)  HR: 89 (09-05-19 @ 05:27) (78 - 89)  BP: 110/79 (09-05-19 @ 05:27) (110/79 - 120/86)  RR: 18 (09-05-19 @ 05:27) (18 - 18)  SpO2: 99% (09-05-19 @ 05:27) (98% - 99%)  I&O's Summary    GENERAL: thin, NAD, pleasant, difficult to understand, sad/frustrated  EYES: conjunctiva and sclera clear  NECK: trach, no secretions noted around  CHEST/LUNG: Clear to auscultation bilaterally; No wheeze  HEART: Regular rate and rhythm; No murmurs, rubs, or gallops  ABDOMEN: Soft, Nontender, Nondistended; Bowel sounds present  EXTREMITIES:  2+ Peripheral Pulses, No clubbing, cyanosis, or edema  PSYCH: AAOx3    LABS:  CAPILLARY BLOOD GLUCOSE                              8.9    17.35 )-----------( 394      ( 05 Sep 2019 05:55 )             30.7     09-05    137  |  92<L>  |  20  ----------------------------<  96  4.3   |  32<H>  |  1.06    Ca    9.7      05 Sep 2019 05:55  Phos  3.5     09-04  Mg     1.7     09-04                RADIOLOGY & ADDITIONAL TESTS:    Imaging Personally Reviewed:    Consultant(s) Notes Reviewed:      Care Discussed with Consultants/Other Providers: Patient care discussed with oncology and palliative care during interdisciplinary rounds, case management, nursing management  and social work were also present.

## 2019-09-05 NOTE — CHART NOTE - NSCHARTNOTEFT_GEN_A_CORE
Source: Patient [X ]    Family [ ]     other [ X] electronic chart, RN    Diet : Diet, Pureed:   Dysphagia 1, Pureed, Honey Consistency Fluid (DYS1HC)  Supplement Feeding Modality:  Oral  Ensure Enlive Cans or Servings Per Day:  3       Frequency:  Daily (09-01-19 @ 13:33)    Patient seen for nutrition follow-up/ severe malnutrition follow-up. S/p swallow VFSS/MBS assessment on 8/29/19-recommended Pureed Honey Consistency. Patient tolerating diet well, reports fair po intake. Also consuming PO supplement Ensure Enlive. Patient denies any nausea/vomiting/diarrhea/constipation or difficulty chewing and swallowing at this time. Noted Palliative consult for GOC-pt w/ poor prognosis, ongoing GOC- hospice referral noted. Encouraged good po intake of nutrient and protein dense foods. RDN remains available, patient made aware.      Weight: 44kg 8/28      Pertinent Medications: docusate sodium Liquid  gabapentin   Solution  levothyroxine  loratadine  methadone   Solution  senna    Pertinent Labs:  09-05 Na137 mmol/L Glu 96 mg/dL K+ 4.3 mmol/L Cr  1.06 mg/dL BUN 20 mg/dL 09-04 Phos 3.5 mg/dL 09-03 Alb 3.4 g/dL      Skin: intact. No edema noted.     Estimated Needs:   [X ] no change since previous assessment  [ ] recalculated:       Previous Nutrition Diagnosis:     [ ] Inadequate Energy Intake [ ]Inadequate Oral Intake [ ] Excessive Energy Intake     [ ] Underweight [ ] Increased Nutrient Needs [ ] Overweight/Obesity     [ ] Altered GI Function [ ] Unintended Weight Loss [ ] Food & Nutrition Related Knowledge Deficit [ X] Malnutrition, severe     Nutrition Diagnosis is [X ] ongoing  [ ] resolved [ ] not applicable       Additional Recommendations:   1. Continue current diet as tolerated. Provide PO supplement Ensure Enlive thickened to Honey Consistency.    2. Monitor weights, labs, BM's, skin integrity, p.o. intake.   3. Please Encourage po intake, assist with meals and menu selections, provide alternatives PRN.

## 2019-09-05 NOTE — PROGRESS NOTE ADULT - PROBLEM SELECTOR PLAN 1
Likely due to progressive malignancy  - Nutrition and PT evaluation  - s/p cinesophagram recommending puree with Honey Thick Liquids, resume aspiration precautions  Advance care planning, meeting held on Friday (Aug 30th), family and patient were not unreceptive to the idea of hospice and continued to desire treatment, even if the risks outweigh the benefits.   9/3- Family and patient also request ENT consult to see if there is an option for tumor debulking. Case d/w the Dr. Hawley, any debulking would likely lead to significant bleeding and therefore this option is not offered to the patient. Patient to be discharged with home hospice.

## 2019-09-06 ENCOUNTER — TRANSCRIPTION ENCOUNTER (OUTPATIENT)
Age: 54
End: 2019-09-06

## 2019-09-06 LAB
ANION GAP SERPL CALC-SCNC: 12 MMO/L — SIGNIFICANT CHANGE UP (ref 7–14)
BASOPHILS # BLD AUTO: 0.09 K/UL — SIGNIFICANT CHANGE UP (ref 0–0.2)
BASOPHILS NFR BLD AUTO: 0.5 % — SIGNIFICANT CHANGE UP (ref 0–2)
BUN SERPL-MCNC: 18 MG/DL — SIGNIFICANT CHANGE UP (ref 7–23)
CALCIUM SERPL-MCNC: 9.7 MG/DL — SIGNIFICANT CHANGE UP (ref 8.4–10.5)
CHLORIDE SERPL-SCNC: 92 MMOL/L — LOW (ref 98–107)
CO2 SERPL-SCNC: 34 MMOL/L — HIGH (ref 22–31)
CREAT SERPL-MCNC: 0.91 MG/DL — SIGNIFICANT CHANGE UP (ref 0.5–1.3)
EOSINOPHIL # BLD AUTO: 0.15 K/UL — SIGNIFICANT CHANGE UP (ref 0–0.5)
EOSINOPHIL NFR BLD AUTO: 0.9 % — SIGNIFICANT CHANGE UP (ref 0–6)
GLUCOSE SERPL-MCNC: 140 MG/DL — HIGH (ref 70–99)
HCT VFR BLD CALC: 32.2 % — LOW (ref 39–50)
HGB BLD-MCNC: 9.3 G/DL — LOW (ref 13–17)
IMM GRANULOCYTES NFR BLD AUTO: 0.6 % — SIGNIFICANT CHANGE UP (ref 0–1.5)
LYMPHOCYTES # BLD AUTO: 0.51 K/UL — LOW (ref 1–3.3)
LYMPHOCYTES # BLD AUTO: 2.9 % — LOW (ref 13–44)
MCHC RBC-ENTMCNC: 19.3 PG — LOW (ref 27–34)
MCHC RBC-ENTMCNC: 28.9 % — LOW (ref 32–36)
MCV RBC AUTO: 66.7 FL — LOW (ref 80–100)
MONOCYTES # BLD AUTO: 0.95 K/UL — HIGH (ref 0–0.9)
MONOCYTES NFR BLD AUTO: 5.5 % — SIGNIFICANT CHANGE UP (ref 2–14)
NEUTROPHILS # BLD AUTO: 15.49 K/UL — HIGH (ref 1.8–7.4)
NEUTROPHILS NFR BLD AUTO: 89.6 % — HIGH (ref 43–77)
NRBC # FLD: 0 K/UL — SIGNIFICANT CHANGE UP (ref 0–0)
PLATELET # BLD AUTO: 456 K/UL — HIGH (ref 150–400)
PMV BLD: 10.2 FL — SIGNIFICANT CHANGE UP (ref 7–13)
POTASSIUM SERPL-MCNC: 4 MMOL/L — SIGNIFICANT CHANGE UP (ref 3.5–5.3)
POTASSIUM SERPL-SCNC: 4 MMOL/L — SIGNIFICANT CHANGE UP (ref 3.5–5.3)
RBC # BLD: 4.83 M/UL — SIGNIFICANT CHANGE UP (ref 4.2–5.8)
RBC # FLD: 15.5 % — HIGH (ref 10.3–14.5)
SODIUM SERPL-SCNC: 138 MMOL/L — SIGNIFICANT CHANGE UP (ref 135–145)
WBC # BLD: 17.29 K/UL — HIGH (ref 3.8–10.5)
WBC # FLD AUTO: 17.29 K/UL — HIGH (ref 3.8–10.5)

## 2019-09-06 PROCEDURE — 99233 SBSQ HOSP IP/OBS HIGH 50: CPT

## 2019-09-06 RX ADMIN — GABAPENTIN 300 MILLIGRAM(S): 400 CAPSULE ORAL at 05:45

## 2019-09-06 RX ADMIN — METHADONE HYDROCHLORIDE 2.5 MILLIGRAM(S): 40 TABLET ORAL at 05:46

## 2019-09-06 RX ADMIN — Medication 25 MICROGRAM(S): at 05:46

## 2019-09-06 RX ADMIN — GABAPENTIN 300 MILLIGRAM(S): 400 CAPSULE ORAL at 17:21

## 2019-09-06 RX ADMIN — Medication 650 MILLIGRAM(S): at 05:14

## 2019-09-06 RX ADMIN — CHLORHEXIDINE GLUCONATE 1 APPLICATION(S): 213 SOLUTION TOPICAL at 05:46

## 2019-09-06 RX ADMIN — Medication 650 MILLIGRAM(S): at 04:20

## 2019-09-06 RX ADMIN — Medication 650 MILLIGRAM(S): at 14:05

## 2019-09-06 RX ADMIN — FLUCONAZOLE 200 MILLIGRAM(S): 150 TABLET ORAL at 14:05

## 2019-09-06 RX ADMIN — METHADONE HYDROCHLORIDE 2.5 MILLIGRAM(S): 40 TABLET ORAL at 17:30

## 2019-09-06 RX ADMIN — LORATADINE 10 MILLIGRAM(S): 10 TABLET ORAL at 14:05

## 2019-09-06 RX ADMIN — Medication 650 MILLIGRAM(S): at 15:00

## 2019-09-06 RX ADMIN — OXYCODONE HYDROCHLORIDE 15 MILLIGRAM(S): 5 TABLET ORAL at 00:27

## 2019-09-06 RX ADMIN — ENOXAPARIN SODIUM 40 MILLIGRAM(S): 100 INJECTION SUBCUTANEOUS at 05:46

## 2019-09-06 NOTE — DISCHARGE NOTE PROVIDER - HOSPITAL COURSE
54 year old male with nasopharyngeal carcinoma (diagnosed 10/18 s/p biopsy, radiation, cisplatin followed by adjuvant cisplatin and 5FU from 11/18 to 5/19, tumor refractory/resistant to chemo/radiation, now receiving on C2 pembrolizumab started 8/23/19), presents with 3-4 days of increased falls, generalized weakness admitted for further workup and evaluation.         Failure to thrive in adult.      - 2/2 progressive disease     - PT rec - home PT    - palliative care     - Heme/Onc cs    - s/p meeting 8/30 - per Onc not eligible for additional treatment given poor performance status but patient wants continued treatment     -Referral to Hospice, Hospice elected, consent signed, planned for home hospice        Nasopharyngeal cancer    - s/p radiation with cisplatin followed by adjuvant cisplatin and 5FU from 11/18 to 5/19    - per oncology, tumor refractory/resistant to chemo/radiation, now receiving pembrolizumab, cycle 2 on 8/23/19    - Oncology consulted    - MRI results reviewed with extensive and progressive disease    - S/p meeting 8/30 - per Onc not eligible for additional treatment given poor performance status but patient wants continued treatment    - ENT cs to eval for debulking -Pt is not a surgical candidate         Hypothyroidism    - synthroid    -stable        Thrush    - Fluconazole x 14 days    - HIV nonreactive 54 year old male with nasopharyngeal carcinoma (diagnosed 10/18 s/p biopsy, radiation, cisplatin followed by adjuvant cisplatin and 5FU from 11/18 to 5/19, tumor refractory/resistant to chemo/radiation, now receiving on C2 pembrolizumab started 8/23/19), presents with 3-4 days of increased falls, generalized weakness admitted for further workup and evaluation.         Failure to thrive in adult.      - 2/2 progressive disease     - PT rec - home PT    - palliative care     - Heme/Onc cs    - s/p meeting 8/30 - per Onc not eligible for additional treatment given poor performance status but patient wants continued treatment     -Referral to Hospice, Hospice elected, consent signed, planned for home hospice        Nasopharyngeal cancer    - s/p radiation with cisplatin followed by adjuvant cisplatin and 5FU from 11/18 to 5/19    - per oncology, tumor refractory/resistant to chemo/radiation, now receiving pembrolizumab, cycle 2 on 8/23/19    - Oncology consulted    - MRI results reviewed with extensive and progressive disease    - S/p meeting 8/30 - per Onc not eligible for additional treatment given poor performance status but patient wants continued treatment    - ENT cs to eval for debulking -Pt is not a surgical candidate         Hypothyroidism    - synthroid    -stable        Thrush    - Fluconazole x 14 days    - HIV nonreactive         dispo: Home hospice, pt stable for discharge per Dr. Lema

## 2019-09-06 NOTE — PROGRESS NOTE ADULT - PROBLEM SELECTOR PLAN 1
Likely due to progressive malignancy  - Nutrition and PT evaluation  - s/p cinesophagram recommending puree with Honey Thick Liquids, resume aspiration precautions  Advance care planning, meeting held on Friday (Aug 30th), family and patient were not receptive to the idea of hospice and continued to desire treatment, even if the risks outweigh the benefits.   9/3- Family and patient also request ENT consult to see if there is an option for tumor debulking. Case d/w the Dr. Hawley, any debulking would likely lead to significant bleeding and therefore this option is not offered to the patient.   Family and patient now agree with home hospice  Patient to be discharged with home hospice on Saturday

## 2019-09-06 NOTE — DISCHARGE NOTE PROVIDER - NSDCCPCAREPLAN_GEN_ALL_CORE_FT
PRINCIPAL DISCHARGE DIAGNOSIS  Diagnosis: Failure to thrive in adult  Assessment and Plan of Treatment: Continue supportive care and pain medications as directed. Please follow up with hospice care services.      SECONDARY DISCHARGE DIAGNOSES  Diagnosis: Nasopharyngeal cancer  Assessment and Plan of Treatment: Plan as above    Diagnosis: Thrush  Assessment and Plan of Treatment: continue fluconazole through 9/12

## 2019-09-06 NOTE — PROGRESS NOTE ADULT - SUBJECTIVE AND OBJECTIVE BOX
Patient is a 54y old  Male who presents with a chief complaint of falls x 3-4 days (05 Sep 2019 13:39)      SUBJECTIVE / OVERNIGHT EVENTS:  Patient seen and examined this morning. To be discharged tomorrow. He continues to have occasional trouble breathing. He will be discharged tomorrow with home hospice.     MEDICATIONS  (STANDING):  chlorhexidine 4% Liquid 1 Application(s) Topical <User Schedule>  docusate sodium Liquid 100 milliGRAM(s) Oral two times a day  enoxaparin Injectable 40 milliGRAM(s) SubCutaneous every 24 hours  fluconAZOLE   Tablet 200 milliGRAM(s) Oral daily  gabapentin   Solution 300 milliGRAM(s) Oral two times a day  influenza   Vaccine 0.5 milliLiter(s) IntraMuscular once  levothyroxine 25 MICROGram(s) Oral daily  loratadine 10 milliGRAM(s) Oral daily  methadone   Solution 2.5 milliGRAM(s) Oral two times a day  senna 2 Tablet(s) Oral at bedtime    MEDICATIONS  (PRN):  acetaminophen    Suspension .. 650 milliGRAM(s) Oral every 6 hours PRN Mild Pain (1 - 3)  ondansetron Injectable 4 milliGRAM(s) IV Push every 6 hours PRN Nausea and/or Vomiting  oxyCODONE    IR 15 milliGRAM(s) Oral every 6 hours PRN Severe Pain (7 - 10)      PHYSICAL EXAM:  T(C): 36.7 (09-06-19 @ 05:18), Max: 36.7 (09-06-19 @ 05:18)  HR: 98 (09-06-19 @ 05:18) (98 - 105)  BP: 112/82 (09-06-19 @ 05:18) (105/82 - 114/88)  RR: 19 (09-06-19 @ 05:18) (18 - 19)  SpO2: 97% (09-06-19 @ 05:18) (96% - 99%)  I&O's Summary    GENERAL: thin, NAD, pleasant, difficult to understand, sad/frustrated  EYES: conjunctiva and sclera clear  NECK: trach, no secretions noted around  CHEST/LUNG: Clear to auscultation bilaterally; No wheeze  HEART: Regular rate and rhythm; No murmurs, rubs, or gallops  ABDOMEN: Soft, Nontender, Nondistended; Bowel sounds present  EXTREMITIES:  2+ Peripheral Pulses, No clubbing, cyanosis, or edema  PSYCH: [id number]    LABS:  CAPILLARY BLOOD GLUCOSE                              9.3    17.29 )-----------( 456      ( 06 Sep 2019 06:20 )             32.2     09-06    138  |  92<L>  |  18  ----------------------------<  140<H>  4.0   |  34<H>  |  0.91    Ca    9.7      06 Sep 2019 06:20                RADIOLOGY & ADDITIONAL TESTS:    Imaging Personally Reviewed:    Consultant(s) Notes Reviewed:      Care Discussed with Consultants/Other Providers: Patient care discussed with oncology and palliative care during interdisciplinary rounds, case management, nursing management  and social work were also present.

## 2019-09-07 ENCOUNTER — TRANSCRIPTION ENCOUNTER (OUTPATIENT)
Age: 54
End: 2019-09-07

## 2019-09-07 VITALS
OXYGEN SATURATION: 95 % | TEMPERATURE: 98 F | SYSTOLIC BLOOD PRESSURE: 118 MMHG | RESPIRATION RATE: 16 BRPM | HEART RATE: 96 BPM | DIASTOLIC BLOOD PRESSURE: 79 MMHG

## 2019-09-07 PROCEDURE — 99239 HOSP IP/OBS DSCHRG MGMT >30: CPT

## 2019-09-07 RX ORDER — GABAPENTIN 400 MG/1
6 CAPSULE ORAL
Qty: 360 | Refills: 0
Start: 2019-09-07 | End: 2019-10-06

## 2019-09-07 RX ORDER — OXYCODONE HYDROCHLORIDE 5 MG/1
1 TABLET ORAL
Qty: 0 | Refills: 0 | DISCHARGE

## 2019-09-07 RX ORDER — PROCHLORPERAZINE MALEATE 5 MG
1 TABLET ORAL
Qty: 21 | Refills: 0
Start: 2019-09-07 | End: 2019-09-13

## 2019-09-07 RX ORDER — METHADONE HYDROCHLORIDE 40 MG/1
1.25 TABLET ORAL
Qty: 17.5 | Refills: 0
Start: 2019-09-07 | End: 2019-09-13

## 2019-09-07 RX ORDER — DOCUSATE SODIUM 100 MG
1 CAPSULE ORAL
Qty: 0 | Refills: 0 | DISCHARGE

## 2019-09-07 RX ORDER — LEVOTHYROXINE SODIUM 125 MCG
1 TABLET ORAL
Qty: 30 | Refills: 0
Start: 2019-09-07 | End: 2019-10-06

## 2019-09-07 RX ORDER — OXYCODONE HYDROCHLORIDE 5 MG/1
1 TABLET ORAL
Qty: 28 | Refills: 0
Start: 2019-09-07 | End: 2019-09-13

## 2019-09-07 RX ORDER — LORATADINE 10 MG/1
1 TABLET ORAL
Qty: 30 | Refills: 0
Start: 2019-09-07 | End: 2019-10-06

## 2019-09-07 RX ORDER — GABAPENTIN 400 MG/1
6 CAPSULE ORAL
Qty: 0 | Refills: 0 | DISCHARGE

## 2019-09-07 RX ORDER — SENNA PLUS 8.6 MG/1
1 TABLET ORAL
Qty: 0 | Refills: 0 | DISCHARGE

## 2019-09-07 RX ORDER — DOCUSATE SODIUM 100 MG
10 CAPSULE ORAL
Qty: 600 | Refills: 0
Start: 2019-09-07 | End: 2019-10-06

## 2019-09-07 RX ORDER — ONDANSETRON 8 MG/1
1 TABLET, FILM COATED ORAL
Qty: 21 | Refills: 0
Start: 2019-09-07 | End: 2019-09-13

## 2019-09-07 RX ORDER — FLUCONAZOLE 150 MG/1
1 TABLET ORAL
Qty: 5 | Refills: 0
Start: 2019-09-07 | End: 2019-09-11

## 2019-09-07 RX ORDER — ACETAMINOPHEN 500 MG
20 TABLET ORAL
Qty: 560 | Refills: 0
Start: 2019-09-07 | End: 2019-09-13

## 2019-09-07 RX ORDER — SENNA PLUS 8.6 MG/1
2 TABLET ORAL
Qty: 60 | Refills: 0
Start: 2019-09-07 | End: 2019-10-06

## 2019-09-07 RX ADMIN — GABAPENTIN 300 MILLIGRAM(S): 400 CAPSULE ORAL at 05:17

## 2019-09-07 RX ADMIN — ENOXAPARIN SODIUM 40 MILLIGRAM(S): 100 INJECTION SUBCUTANEOUS at 05:17

## 2019-09-07 RX ADMIN — LORATADINE 10 MILLIGRAM(S): 10 TABLET ORAL at 12:35

## 2019-09-07 RX ADMIN — OXYCODONE HYDROCHLORIDE 15 MILLIGRAM(S): 5 TABLET ORAL at 03:57

## 2019-09-07 RX ADMIN — Medication 25 MICROGRAM(S): at 05:17

## 2019-09-07 RX ADMIN — CHLORHEXIDINE GLUCONATE 1 APPLICATION(S): 213 SOLUTION TOPICAL at 05:17

## 2019-09-07 RX ADMIN — OXYCODONE HYDROCHLORIDE 15 MILLIGRAM(S): 5 TABLET ORAL at 04:57

## 2019-09-07 RX ADMIN — FLUCONAZOLE 200 MILLIGRAM(S): 150 TABLET ORAL at 13:13

## 2019-09-07 RX ADMIN — METHADONE HYDROCHLORIDE 2.5 MILLIGRAM(S): 40 TABLET ORAL at 05:26

## 2019-09-07 RX ADMIN — Medication 100 MILLIGRAM(S): at 12:35

## 2019-09-07 NOTE — PROGRESS NOTE ADULT - PROBLEM SELECTOR PLAN 1
Likely due to progressive malignancy  - Nutrition and PT evaluation  - s/p cinesophagram recommending puree with Honey Thick Liquids, resume aspiration precautions  Advance care planning, meeting held on Friday (Aug 30th), family and patient were not receptive to the idea of hospice and continued to desire treatment, even if the risks outweigh the benefits.   9/3- Family and patient also request ENT consult to see if there is an option for tumor debulking. Case d/w the Dr. Hawley, any debulking would likely lead to significant bleeding and therefore this option is not offered to the patient.   Family and patient now agree with home hospice  Patient to be discharged with home hospice on Saturday-today

## 2019-09-07 NOTE — PROGRESS NOTE ADULT - PROBLEM SELECTOR PLAN 5
Lovenox subcutaneous

## 2019-09-07 NOTE — PROGRESS NOTE ADULT - PROBLEM SELECTOR PLAN 4
Continue home synthroid
Lovenox subcutaneous
Continue home synthroid
Patient's diagnosis, poor prognosis, and disease trajectory discussed by Dr. Leon 8/30. Patient was told that tumor growth persisted despite chemotherapy and adjuvant therapy. Patient's family present bedside. Hospice services were discussed. Patient stated pain is better controlled and he is able to think more clearly. Extended family; Abiodun (Brother-in-law) reached out to team via email stating both patient and his wife were interested in possible transition of care to home with hospice serviced. Hospice consult placed. Patient remains full code.

## 2019-09-07 NOTE — PROGRESS NOTE ADULT - PROBLEM SELECTOR PLAN 3
Likely due to immunocompromised state  HIV neg  - continue IV fluconazole 200 IV qd for 14 days
Likely due to immunocompromised state  - start IV fluconazole 200 IV qd for 14 days  - F/U HIV
Continue home synthroid
Likely due to immunocompromised state  HIV neg  - continue IV fluconazole 200 IV qd for 14 days
Likely due to immunocompromised state  - continue IV fluconazole 200 IV qd for 14 days  - F/U HIV
Likely due to immunocompromised state  HIV neg  - continue IV fluconazole 200 IV qd for 14 days
Likely due to immunocompromised state  HIV neg  - continue IV fluconazole 200 IV qd for 14 days
HCP is patient's wife (Samra Smith). On initial visit patient and family asked for time to think about hospice care and advanced directives. Patient stated that he would like to return home once pain is controlled. Patient's brother-in-law Abiodun stated that patient and his wife would like to hear more about Hospice services and consider possible transition home with hospice.

## 2019-09-07 NOTE — PROGRESS NOTE ADULT - PROBLEM SELECTOR PLAN 2
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  MRH with extensive, progressive disease  - F/U MR Face/orbits  s/p family meeting to address GOC, family not ready to make decision yet   - Oncology following, appreciate recs
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  - Per oncology, further evaluate with MRI head/neck  - Oncology following, appreciate recs
- Oncology following, appreciate recs  - Patient to be discharged with home hospice
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  - Oncology consulted, appreciate recs  - Per oncology, further evaluate with MRI head/neck
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  MR with extensive, progressive disease  s/p family meeting to address GOC, family not ready to make decision yet   - Oncology following, appreciate recs  - Meeting with hospice on 9/4
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  MR with extensive, progressive disease  s/p family meeting to address GOC, family not ready to make decision yet   - Oncology following, appreciate recs  - Patient to be discharged with home hospice
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  MRH with extensive, progressive disease  - F/U MR Face/orbits  s/p family meeting to address GOC, family not ready to make decision yet   - Oncology following, appreciate recs
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  MRH with extensive, progressive disease  - F/U MR Face/orbits  s/p family meeting to address GOC, family not ready to make decision yet   - Oncology following, appreciate recs
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  MRH with extensive, progressive disease  - Plan for family meeting to discuss GOC at 3pm today  - Oncology following, appreciate recs
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  MR with extensive, progressive disease  s/p family meeting to address GOC, family not ready to make decision yet   - Oncology following, appreciate recs
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  MR with extensive, progressive disease  s/p family meeting to address GOC, family not ready to make decision yet   - Oncology following, appreciate recs  - Patient to be discharged with home hospice
Biopsy from 10/18 revealed non-keratinizing, differentiated nasopharyngeal carcinoma s/p radiation with cisplatin followed by adjuvant cisplatin and 5FU from 11/18 to 5/19 at [facility], now established care with oncologist Dr. Leon, per oncology, tumor refractory/resistant to chemo/radiation, s/p pembrolizumab (cycle 2 on 8/23/19). As per oncology, both treatments have failed to suppress aggressive tumor growth. No DMT can be offered.

## 2019-09-07 NOTE — PROGRESS NOTE ADULT - PROBLEM SELECTOR PROBLEM 4
Hypothyroidism, unspecified type
Palliative care encounter
Hypothyroidism, unspecified type
DVT prophylaxis
Hypothyroidism, unspecified type

## 2019-09-07 NOTE — PROGRESS NOTE ADULT - REASON FOR ADMISSION
falls x 3-4 days

## 2019-09-07 NOTE — PROGRESS NOTE ADULT - PROVIDER SPECIALTY LIST ADULT
Heme/Onc
Hospitalist
Palliative Care
Palliative Care
Hospitalist

## 2019-09-07 NOTE — PROGRESS NOTE ADULT - SUBJECTIVE AND OBJECTIVE BOX
Patient is a 54y old  Male who presents with a chief complaint of falls x 3-4 days (06 Sep 2019 15:56)      SUBJECTIVE / OVERNIGHT EVENTS:  Patient seen and examined this morning. To be discharged with home hospice today. No reports of fevers or chills.     MEDICATIONS  (STANDING):  chlorhexidine 4% Liquid 1 Application(s) Topical <User Schedule>  docusate sodium Liquid 100 milliGRAM(s) Oral two times a day  enoxaparin Injectable 40 milliGRAM(s) SubCutaneous every 24 hours  fluconAZOLE   Tablet 200 milliGRAM(s) Oral daily  gabapentin   Solution 300 milliGRAM(s) Oral two times a day  influenza   Vaccine 0.5 milliLiter(s) IntraMuscular once  levothyroxine 25 MICROGram(s) Oral daily  loratadine 10 milliGRAM(s) Oral daily  methadone   Solution 2.5 milliGRAM(s) Oral two times a day  senna 2 Tablet(s) Oral at bedtime    MEDICATIONS  (PRN):  acetaminophen    Suspension .. 650 milliGRAM(s) Oral every 6 hours PRN Mild Pain (1 - 3)  ondansetron Injectable 4 milliGRAM(s) IV Push every 6 hours PRN Nausea and/or Vomiting  oxyCODONE    IR 15 milliGRAM(s) Oral every 6 hours PRN Severe Pain (7 - 10)      PHYSICAL EXAM:  T(C): 36.3 (09-07-19 @ 06:23), Max: 36.7 (09-06-19 @ 21:00)  HR: 95 (09-07-19 @ 06:23) (95 - 99)  BP: 129/74 (09-07-19 @ 06:23) (109/70 - 129/74)  RR: 17 (09-07-19 @ 06:23) (16 - 19)  SpO2: 95% (09-07-19 @ 06:23) (94% - 97%)  I&O's Summary    GENERAL: thin, NAD, pleasant, difficult to understand  EYES: conjunctiva and sclera clear  NECK: trach, no secretions noted around  CHEST/LUNG: Clear to auscultation bilaterally; No wheeze  HEART: Regular rate and rhythm; No murmurs, rubs, or gallops  ABDOMEN: Soft, Nontender, Nondistended; Bowel sounds present  EXTREMITIES:  2+ Peripheral Pulses, No clubbing, cyanosis, or edema  PSYCH: [id number]    LABS:  CAPILLARY BLOOD GLUCOSE                              9.3    17.29 )-----------( 456      ( 06 Sep 2019 06:20 )             32.2     09-06    138  |  92<L>  |  18  ----------------------------<  140<H>  4.0   |  34<H>  |  0.91    Ca    9.7      06 Sep 2019 06:20                RADIOLOGY & ADDITIONAL TESTS:    Imaging Personally Reviewed:    Consultant(s) Notes Reviewed:      Care Discussed with Consultants/Other Providers: Patient is a 54y old  Male who presents with a chief complaint of falls x 3-4 days (06 Sep 2019 15:56)      SUBJECTIVE / OVERNIGHT EVENTS:  Patient seen and examined this morning. To be discharged with home hospice today. Appears more lethargic. No reports of fevers or chills.     MEDICATIONS  (STANDING):  chlorhexidine 4% Liquid 1 Application(s) Topical <User Schedule>  docusate sodium Liquid 100 milliGRAM(s) Oral two times a day  enoxaparin Injectable 40 milliGRAM(s) SubCutaneous every 24 hours  fluconAZOLE   Tablet 200 milliGRAM(s) Oral daily  gabapentin   Solution 300 milliGRAM(s) Oral two times a day  influenza   Vaccine 0.5 milliLiter(s) IntraMuscular once  levothyroxine 25 MICROGram(s) Oral daily  loratadine 10 milliGRAM(s) Oral daily  methadone   Solution 2.5 milliGRAM(s) Oral two times a day  senna 2 Tablet(s) Oral at bedtime    MEDICATIONS  (PRN):  acetaminophen    Suspension .. 650 milliGRAM(s) Oral every 6 hours PRN Mild Pain (1 - 3)  ondansetron Injectable 4 milliGRAM(s) IV Push every 6 hours PRN Nausea and/or Vomiting  oxyCODONE    IR 15 milliGRAM(s) Oral every 6 hours PRN Severe Pain (7 - 10)      PHYSICAL EXAM:  T(C): 36.3 (09-07-19 @ 06:23), Max: 36.7 (09-06-19 @ 21:00)  HR: 95 (09-07-19 @ 06:23) (95 - 99)  BP: 129/74 (09-07-19 @ 06:23) (109/70 - 129/74)  RR: 17 (09-07-19 @ 06:23) (16 - 19)  SpO2: 95% (09-07-19 @ 06:23) (94% - 97%)  I&O's Summary    GENERAL: thin, NAD, pleasant, difficult to understand  EYES: conjunctiva and sclera clear  NECK: trach, no secretions noted around  CHEST/LUNG: Clear to auscultation bilaterally; No wheeze  HEART: Regular rate and rhythm; No murmurs, rubs, or gallops  ABDOMEN: Soft, Nontender, Nondistended; Bowel sounds present  EXTREMITIES:  2+ Peripheral Pulses, No clubbing, cyanosis, or edema  PSYCH: [id number]    LABS:  CAPILLARY BLOOD GLUCOSE                              9.3    17.29 )-----------( 456      ( 06 Sep 2019 06:20 )             32.2     09-06    138  |  92<L>  |  18  ----------------------------<  140<H>  4.0   |  34<H>  |  0.91    Ca    9.7      06 Sep 2019 06:20                RADIOLOGY & ADDITIONAL TESTS:    Imaging Personally Reviewed:    Consultant(s) Notes Reviewed:      Care Discussed with Consultants/Other Providers:

## 2019-09-07 NOTE — PROGRESS NOTE ADULT - PROBLEM SELECTOR PROBLEM 5
DVT prophylaxis

## 2019-09-07 NOTE — PROGRESS NOTE ADULT - PROBLEM SELECTOR PROBLEM 1
Failure to thrive in adult
Pain due to neoplasm
Failure to thrive in adult

## 2019-09-07 NOTE — DISCHARGE NOTE NURSING/CASE MANAGEMENT/SOCIAL WORK - PATIENT PORTAL LINK FT
You can access the FollowMyHealth Patient Portal offered by [facility] by registering at the following website: [email or web address]. By joining [employer]’s FollowMyHealth portal, you will also be able to view your health information using other applications (apps) compatible with our system.

## 2019-09-13 ENCOUNTER — APPOINTMENT (OUTPATIENT)
Dept: HEMATOLOGY ONCOLOGY | Facility: CLINIC | Age: 54
End: 2019-09-13

## 2019-09-13 ENCOUNTER — APPOINTMENT (OUTPATIENT)
Dept: HEMATOLOGY ONCOLOGY | Facility: CLINIC | Age: 54
End: 2019-09-13
Payer: COMMERCIAL

## 2019-09-13 ENCOUNTER — APPOINTMENT (OUTPATIENT)
Dept: INFUSION THERAPY | Facility: HOSPITAL | Age: 54
End: 2019-09-13

## 2019-09-13 VITALS
TEMPERATURE: 97.7 F | SYSTOLIC BLOOD PRESSURE: 122 MMHG | BODY MASS INDEX: 13.34 KG/M2 | WEIGHT: 82.67 LBS | HEART RATE: 110 BPM | RESPIRATION RATE: 14 BRPM | DIASTOLIC BLOOD PRESSURE: 88 MMHG | OXYGEN SATURATION: 96 %

## 2019-09-13 DIAGNOSIS — G89.3 NEOPLASM RELATED PAIN (ACUTE) (CHRONIC): ICD-10-CM

## 2019-09-13 DIAGNOSIS — Z86.19 PERSONAL HISTORY OF OTHER INFECTIOUS AND PARASITIC DISEASES: ICD-10-CM

## 2019-09-13 DIAGNOSIS — C11.9 MALIGNANT NEOPLASM OF NASOPHARYNX, UNSPECIFIED: ICD-10-CM

## 2019-09-13 DIAGNOSIS — Z87.09 PERSONAL HISTORY OF OTHER DISEASES OF THE RESPIRATORY SYSTEM: ICD-10-CM

## 2019-09-13 DIAGNOSIS — Z79.899 OTHER LONG TERM (CURRENT) DRUG THERAPY: ICD-10-CM

## 2019-09-13 PROCEDURE — 99214 OFFICE O/P EST MOD 30 MIN: CPT

## 2019-09-13 RX ORDER — PROCHLORPERAZINE MALEATE 10 MG/1
10 TABLET ORAL
Qty: 30 | Refills: 1 | Status: DISCONTINUED | COMMUNITY
Start: 2019-07-30 | End: 2019-09-13

## 2019-09-13 RX ORDER — OXYCODONE AND ACETAMINOPHEN 5; 325 MG/1; MG/1
5-325 TABLET ORAL
Refills: 0 | Status: DISCONTINUED | COMMUNITY
End: 2019-09-13

## 2019-09-13 RX ORDER — POLYETHYLENE GLYCOL 3350 17 G/17G
17 POWDER, FOR SOLUTION ORAL
Qty: 1 | Refills: 0 | Status: DISCONTINUED | COMMUNITY
Start: 2019-07-30 | End: 2019-09-13

## 2019-09-13 RX ORDER — FLUCONAZOLE 10 MG/ML
10 POWDER, FOR SUSPENSION ORAL
Qty: 5 | Refills: 0 | Status: DISCONTINUED | COMMUNITY
Start: 2019-07-30 | End: 2019-09-13

## 2019-09-13 RX ORDER — CALCIUM CARBONATE/VITAMIN D3 500-10/5ML
400 LIQUID (ML) ORAL
Qty: 30 | Refills: 0 | Status: DISCONTINUED | COMMUNITY
Start: 2019-07-31 | End: 2019-09-13

## 2019-09-13 RX ORDER — NAPROXEN SODIUM 220 MG
220 TABLET ORAL
Refills: 0 | Status: DISCONTINUED | COMMUNITY
End: 2019-09-13

## 2019-09-13 RX ORDER — FENTANYL 25 UG/H
25 PATCH, EXTENDED RELEASE TRANSDERMAL
Qty: 10 | Refills: 0 | Status: DISCONTINUED | COMMUNITY
Start: 2019-08-22 | End: 2019-09-13

## 2019-09-13 RX ORDER — OXYCODONE HYDROCHLORIDE 5 MG/5ML
5 SOLUTION ORAL
Qty: 3 | Refills: 0 | Status: DISCONTINUED | COMMUNITY
Start: 2019-07-30 | End: 2019-09-13

## 2019-09-13 RX ORDER — METHADONE HYDROCHLORIDE 5 MG/1
TABLET ORAL
Refills: 0 | Status: ACTIVE | COMMUNITY

## 2019-09-13 RX ORDER — FENTANYL 12 UG/H
12 PATCH, EXTENDED RELEASE TRANSDERMAL
Qty: 10 | Refills: 0 | Status: DISCONTINUED | COMMUNITY
Start: 2019-08-15 | End: 2019-09-13

## 2019-09-13 NOTE — HISTORY OF PRESENT ILLNESS
[Disease: _____________________] : Disease: [unfilled] [de-identified] : Mr. BIGG GARZA is a pleasant 54 year old man who comes here today from hospice to be evaluated for his diagnosis of nasopharyngeal cancer.\par \par Oncological history\par Mr. Garza noted around March 2018 along with left sided hearing loss, bilateral nasal congestion and headaches. As symptoms did not improved, he sought medical care. Eventually he underwent a biopsy in October 2018 as below. \par \par Pathology- nasopharyngeal biopsy ([place]) 10/19/18- non-keratinizing, differentiated nasopharyngeal carcinoma\par \par CT chest 11/13/18- small calcified granuloma right apex. Subcentimeter right hepatic cyst 4mm. \par \par MRI neck 11/13/2018- 2.6x4.2x5.4cm left nasopharyngeal carcinoma extending into the skull base. Borderline right level 1 and 2 nodes. \par \par Treated with radiation (70Gy) with cisplatin followed by adjuvant cisplatin 70mg/m2 and 5FU from November 27, 2018 to May 8, 2019 at [facility]. \par \par CT neck 4/16/19- decrease in size of nasopharyngeal mass. \par \par Around March 2019 he continued to have headaches and as they became worse, further imaging was done. \par \par PET 7/9/19- Large lobulated active nasopharyngeal mass with SUV of 21.9, extending from the upper oropharynx into the skull base. Comparison to CT form April shows tumor grow. Multiple faint infiltrates in both lungs, likely inflammatory. There is no evidence of metastatic disease- official report not available. \par \par MRI head 7/15/19\par Findings are compatible with extensive recurrence involving the central skull base, nasopharynx, oropharynx, and parapharyngeal soft tissues, as above. Polypoid intraluminal mass filling the nasopharynx and oropharynx demonstrates unusual signal characteristics along its inferior margin with intrinsic high T1 signal and internal signal voids at are possibly vascular. \par \par Patient was admitted to [facility] recently due to failure to thrive and for pain control. Repeat MRI showed progressive disease. Enrolled in hospice. \par \par \par \par  [de-identified] : non-keratinizing nasopharyngeal carcinoma [de-identified] : EBV and p16 positive [de-identified] : faint lung infiltrates\par refractory to chemo/radiation [de-identified] : Patient is here with his wife. Very sleepy, states that pain is better controlled. Some nausea. Eating very little. All other ROS negative.

## 2019-09-19 ENCOUNTER — EMERGENCY (EMERGENCY)
Facility: HOSPITAL | Age: 54
LOS: 1 days | Discharge: HOME | End: 2019-09-19
Attending: EMERGENCY MEDICINE
Payer: COMMERCIAL

## 2019-09-19 VITALS
OXYGEN SATURATION: 98 % | RESPIRATION RATE: 22 BRPM | HEART RATE: 122 BPM | SYSTOLIC BLOOD PRESSURE: 65 MMHG | DIASTOLIC BLOOD PRESSURE: 53 MMHG

## 2019-09-19 VITALS
TEMPERATURE: 97 F | HEART RATE: 106 BPM | RESPIRATION RATE: 22 BRPM | OXYGEN SATURATION: 98 % | SYSTOLIC BLOOD PRESSURE: 72 MMHG | DIASTOLIC BLOOD PRESSURE: 64 MMHG

## 2019-09-19 DIAGNOSIS — Z98.49 CATARACT EXTRACTION STATUS, UNSPECIFIED EYE: Chronic | ICD-10-CM

## 2019-09-19 PROCEDURE — 99284 EMERGENCY DEPT VISIT MOD MDM: CPT | Mod: 25

## 2019-09-19 PROCEDURE — 99284 EMERGENCY DEPT VISIT MOD MDM: CPT

## 2019-09-19 PROCEDURE — 96374 THER/PROPH/DIAG INJ IV PUSH: CPT

## 2019-09-19 RX ORDER — SODIUM CHLORIDE 9 MG/ML
1000 INJECTION, SOLUTION INTRAVENOUS
Refills: 0 | Status: DISCONTINUED | OUTPATIENT
Start: 2019-09-19 | End: 2019-09-19

## 2019-09-19 RX ORDER — ACETAMINOPHEN 500 MG
500 TABLET ORAL ONCE
Refills: 0 | Status: COMPLETED | OUTPATIENT
Start: 2019-09-19 | End: 2019-09-19

## 2019-09-19 RX ADMIN — Medication 200 MILLIGRAM(S): at 13:46

## 2019-09-19 RX ADMIN — SODIUM CHLORIDE 75 MILLILITER(S): 9 INJECTION, SOLUTION INTRAVENOUS at 12:34

## 2019-09-19 NOTE — ED ADULT NURSE NOTE - OBJECTIVE STATEMENT
54YOM pmh nose CA presents to ED c/o respiratory distess. Pt was suppose to be transported to hospice in [facility] and brought to [facility] by mistake. Pt noted to be lethargic, A&Ox0. Arousable to pain. Per wife at bedside, only wants pain control. Pt DNR/DNI, daughter bringing the molst form from home. Safety and comfort care provided. Pt started on fluids per wife. Will continue to monitor. 54YOM pmh nose CA presents to ED c/o respiratory distess. Pt was suppose to be transported to hospice in [facility] and brought to [facility] by mistake. Pt noted to be lethargic, A&Ox0. Arousable to pain. Per wife at bedside, only wants pain control. Pt usually talks but has been lethargic since last night. Pt DNR/DNI, daughter bringing the molst form from home. Safety and comfort care provided. Pt started on fluids per wife. Will continue to monitor. 54YOM pmh nose CA presents to ED c/o respiratory distess. Pt was suppose to be transported to hospice in [facility] and brought to [facility] by mistake. Pt noted to be lethargic, A&Ox0. Arousable to pain. Per wife at bedside, only wants pain control. Pt usually talks but has been lethargic since last night. Pt DNR/DNI, daughter bringing the molst form from home. Safety and comfort care provided. Pt post already accessed before arrival to ED. Pt started on fluids per wife. Will continue to monitor.

## 2019-09-19 NOTE — ED PROVIDER NOTE - SEVERE SEPSIS ALERT DETAILS
Patient is a hospice / palliative care patient. Diagnosing and treating sepsis is futile for patient's overall goals of care per MOLST and d/w family.

## 2019-09-19 NOTE — ED PROVIDER NOTE - PHYSICAL EXAMINATION
Gen: cachetic, minimally responsive   HENT: Normocephalic, atraumatic. External ears normal, no rhinorrhea, dry mucous membranes.   CV: tachycardia, no M/R/G  Resp: CTAB, non-labored, speaking without difficulty on room air  Abd: soft, non tender, non rigid, no guarding or rebound tenderness  Back: No CVAT bilaterally, no midline ttp  Skin: dry, wwp   Neuro: non-verbal, moving extremities spontaneously   Psych: non-verbal

## 2019-09-19 NOTE — ED PROVIDER NOTE - OBJECTIVE STATEMENT
Patient BIBEMS as they were concerned that he was having respiratory distress and brought him to [facility], was set to go to [facility] facility for hospice comfort care, is DNR/DNI, no longer pursuing life prolonging therapies secondary to prolonged suffering.

## 2019-09-19 NOTE — ED PROVIDER NOTE - CHIEF COMPLAINT
The patient is a 54y male complaining of The patient is a 54y male complaining of respiratory distress

## 2019-09-19 NOTE — ED PROVIDER NOTE - CLINICAL SUMMARY MEDICAL DECISION MAKING FREE TEXT BOX
54M presenting for evaluation of dyspnea, BIBEMS, is hospice patient DNR/DNI, on exam slowly breathing, no obvious pain, will treat with maintenance ivf, discuss with hospice care as paperwork with patient indicates he was to be transferred to [facility] but was brought here instead. 54M presenting for evaluation of dyspnea, BIBEMS, is hospice patient DNR/DNI, on exam slowly breathing, no obvious pain, will treat with maintenance ivf, discuss with hospice care as paperwork with patient indicates he was to be transferred to [facility] but was brought here instead.    DRAGAN Monsalve MD: 54 year old male with nasopharyngeal carcinoma (diagnosed 10/18 s/p biopsy, radiation, cisplatin followed by adjuvant cisplatin and 5FU from 11/18 to 5/19, tumor refractory/resistant to chemo/radiation, BIBA from home hospice, en route to in patient hospice bed at [facility], instead brought to ED by EMS. Per wife, HCP, pt DNR/DNI/CMO, would like to keep pt comfortable and treat pain. Will confirm hospice bed at [facility] and attempt to facilicate transfer to inpt hospice. 54M presenting for evaluation of dyspnea, BIBEMS, is hospice patient DNR/DNI, on exam slowly breathing, no obvious pain, will treat with maintenance ivf, discuss with hospice care as paperwork with patient indicates he was to be transferred to [facility] but was brought here instead.    DRAGAN Monsalve MD: 54 year old male with nasopharyngeal carcinoma (diagnosed 10/18 s/p biopsy, radiation, cisplatin followed by adjuvant cisplatin and 5FU from 11/18 to 5/19, tumor refractory/resistant to chemo/radiation, BIBA from home hospice, en route to in patient hospice bed at [facility], instead brought to ED by EMS. Per wife, HCP, pt DNR/DNI/CMO except for trial of IVF and pain medicine, would like to keep pt comfortable and treat pain. Wife concerned about patient being in pain, however, pt minimally responsive, near-agonal. Will focus on comfort as opposed to resuscitation. Will confirm hospice bed at [facility] and attempt to facilicate transfer to inpt hospice.

## 2019-09-19 NOTE — ED PROVIDER NOTE - PATIENT PORTAL LINK FT
You can access the FollowMyHealth Patient Portal offered by [facility] by registering at the following website: [email or web address]. By joining [employer]’s FollowMyHealth portal, you will also be able to view your health information using other applications (apps) compatible with our system.

## 2019-09-19 NOTE — ED PROVIDER NOTE - NSFOLLOWUPINSTRUCTIONS_ED_ALL_ED_FT
PLEASE DIRECTLY TRANSPORT PATIENT [facility].

## 2019-09-19 NOTE — ED PROVIDER NOTE - PROGRESS NOTE DETAILS
Hospice Care Coordinator, Carie, arrived. States that patient was supposed to be transported from home hospice to [facility] for an impatient hospice bed. Spoke with transfer center who spoke with EMS to help coordinate emergent ambulance transfer.

## 2019-09-19 NOTE — ED PROVIDER NOTE - CARE PLAN
Principal Discharge DX:	Dyspnea and respiratory abnormalities  Secondary Diagnosis:	Palliative care patient

## 2019-09-19 NOTE — ED ADULT NURSE REASSESSMENT NOTE - NS ED NURSE REASSESS COMMENT FT1
Hospice coordinator at bedside, pt suppose to go to [facility], transferred here by mistake. MD [place]field trying to arrange transport to [facility]. COmfort measures provided.

## 2020-03-30 NOTE — DISCHARGE NOTE PROVIDER - NSDCCPGOAL_GEN_ALL_CORE_FT
Continue with no heavy lifting greater than 20 pounds. Call our office if symptoms worsen or fail to improve.   
To get better and follow your care plan as instructed.

## 2021-07-28 NOTE — PATIENT PROFILE ADULT - ABILITY TO HEAR (WITH HEARING AID OR HEARING APPLIANCE IF NORMALLY USED):
Pt receiving lasix and needs strict I/Os. Pt continent and using a urinal at this time. Pt declined a lora catheter, but educated it would be necessary if he has incontinence.     Mildly to Moderately Impaired: difficulty hearing in some environments or speaker may need to increase volume or speak distinctly
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
